# Patient Record
Sex: FEMALE | Race: WHITE | Employment: UNEMPLOYED | URBAN - METROPOLITAN AREA
[De-identification: names, ages, dates, MRNs, and addresses within clinical notes are randomized per-mention and may not be internally consistent; named-entity substitution may affect disease eponyms.]

---

## 2024-06-26 ENCOUNTER — HOSPITAL ENCOUNTER (INPATIENT)
Facility: HOSPITAL | Age: 44
LOS: 6 days | Discharge: HOME | DRG: 885 | End: 2024-07-02
Attending: STUDENT IN AN ORGANIZED HEALTH CARE EDUCATION/TRAINING PROGRAM | Admitting: PSYCHIATRY & NEUROLOGY
Payer: MEDICARE

## 2024-06-26 DIAGNOSIS — F34.1 PERSISTENT DEPRESSIVE DISORDER: ICD-10-CM

## 2024-06-26 DIAGNOSIS — R45.851 SUICIDAL IDEATION: ICD-10-CM

## 2024-06-26 DIAGNOSIS — F25.0 SCHIZOAFFECTIVE DISORDER, BIPOLAR TYPE (HCC): ICD-10-CM

## 2024-06-26 DIAGNOSIS — R45.851 SUICIDAL IDEATIONS: Primary | ICD-10-CM

## 2024-06-26 PROBLEM — F25.9 SCHIZOAFFECTIVE DISORDER (HCC): Status: ACTIVE | Noted: 2024-06-26

## 2024-06-26 LAB
ALBUMIN SERPL-MCNC: 3.3 G/DL (ref 3.5–5)
ALBUMIN/GLOB SERPL: 1 (ref 1.1–2.2)
ALP SERPL-CCNC: 93 U/L (ref 45–117)
ALT SERPL-CCNC: 23 U/L (ref 12–78)
AMPHET UR QL SCN: NEGATIVE
ANION GAP SERPL CALC-SCNC: 7 MMOL/L (ref 5–15)
APPEARANCE UR: ABNORMAL
AST SERPL W P-5'-P-CCNC: 20 U/L (ref 15–37)
BACTERIA URNS QL MICRO: NEGATIVE /HPF
BARBITURATES UR QL SCN: NEGATIVE
BASOPHILS # BLD: 0.1 K/UL (ref 0–0.1)
BASOPHILS NFR BLD: 1 % (ref 0–1)
BENZODIAZ UR QL: NEGATIVE
BILIRUB SERPL-MCNC: 0.4 MG/DL (ref 0.2–1)
BILIRUB UR QL: NEGATIVE
BUN SERPL-MCNC: 11 MG/DL (ref 6–20)
BUN/CREAT SERPL: 14 (ref 12–20)
CA-I BLD-MCNC: 8.5 MG/DL (ref 8.5–10.1)
CANNABINOIDS UR QL SCN: NEGATIVE
CHLORIDE SERPL-SCNC: 109 MMOL/L (ref 97–108)
CO2 SERPL-SCNC: 25 MMOL/L (ref 21–32)
COCAINE UR QL SCN: NEGATIVE
COLOR UR: ABNORMAL
CREAT SERPL-MCNC: 0.81 MG/DL (ref 0.55–1.02)
DIFFERENTIAL METHOD BLD: ABNORMAL
EOSINOPHIL # BLD: 0.4 K/UL (ref 0–0.4)
EOSINOPHIL NFR BLD: 4 % (ref 0–7)
EPITH CASTS URNS QL MICRO: ABNORMAL /LPF
ERYTHROCYTE [DISTWIDTH] IN BLOOD BY AUTOMATED COUNT: 15.7 % (ref 11.5–14.5)
ETHANOL SERPL-MCNC: <10 MG/DL (ref 0–0.08)
FLUAV RNA SPEC QL NAA+PROBE: NOT DETECTED
FLUBV RNA SPEC QL NAA+PROBE: NOT DETECTED
GLOBULIN SER CALC-MCNC: 3.2 G/DL (ref 2–4)
GLUCOSE SERPL-MCNC: 100 MG/DL (ref 65–100)
GLUCOSE UR STRIP.AUTO-MCNC: NEGATIVE MG/DL
HCG SERPL QL: NEGATIVE
HCT VFR BLD AUTO: 31.5 % (ref 35–47)
HGB BLD-MCNC: 10.1 G/DL (ref 11.5–16)
HGB UR QL STRIP: NEGATIVE
IMM GRANULOCYTES # BLD AUTO: 0 K/UL (ref 0–0.04)
IMM GRANULOCYTES NFR BLD AUTO: 0 % (ref 0–0.5)
KETONES UR QL STRIP.AUTO: NEGATIVE MG/DL
LEUKOCYTE ESTERASE UR QL STRIP.AUTO: NEGATIVE
LYMPHOCYTES # BLD: 3.3 K/UL (ref 0.8–3.5)
LYMPHOCYTES NFR BLD: 32 % (ref 12–49)
Lab: NORMAL
MCH RBC QN AUTO: 24.5 PG (ref 26–34)
MCHC RBC AUTO-ENTMCNC: 32.1 G/DL (ref 30–36.5)
MCV RBC AUTO: 76.5 FL (ref 80–99)
METHADONE UR QL: NEGATIVE
MONOCYTES # BLD: 0.9 K/UL (ref 0–1)
MONOCYTES NFR BLD: 9 % (ref 5–13)
MUCOUS THREADS URNS QL MICRO: ABNORMAL /LPF
NEUTS SEG # BLD: 5.5 K/UL (ref 1.8–8)
NEUTS SEG NFR BLD: 54 % (ref 32–75)
NITRITE UR QL STRIP.AUTO: NEGATIVE
NRBC # BLD: 0 K/UL (ref 0–0.01)
NRBC BLD-RTO: 0 PER 100 WBC
OPIATES UR QL: NEGATIVE
PCP UR QL: NEGATIVE
PH UR STRIP: 5 (ref 5–8)
PLATELET # BLD AUTO: 360 K/UL (ref 150–400)
PMV BLD AUTO: 9.5 FL (ref 8.9–12.9)
POTASSIUM SERPL-SCNC: 3.9 MMOL/L (ref 3.5–5.1)
PROT SERPL-MCNC: 6.5 G/DL (ref 6.4–8.2)
PROT UR STRIP-MCNC: 30 MG/DL
RBC # BLD AUTO: 4.12 M/UL (ref 3.8–5.2)
RBC #/AREA URNS HPF: ABNORMAL /HPF (ref 0–5)
SARS-COV-2 RNA RESP QL NAA+PROBE: NOT DETECTED
SODIUM SERPL-SCNC: 141 MMOL/L (ref 136–145)
SP GR UR REFRACTOMETRY: 1.02 (ref 1–1.03)
URINE CULTURE IF INDICATED: ABNORMAL
UROBILINOGEN UR QL STRIP.AUTO: 0.1 EU/DL (ref 0.1–1)
WBC # BLD AUTO: 10.2 K/UL (ref 3.6–11)
WBC URNS QL MICRO: ABNORMAL /HPF (ref 0–4)

## 2024-06-26 PROCEDURE — 99285 EMERGENCY DEPT VISIT HI MDM: CPT

## 2024-06-26 PROCEDURE — 6370000000 HC RX 637 (ALT 250 FOR IP): Performed by: STUDENT IN AN ORGANIZED HEALTH CARE EDUCATION/TRAINING PROGRAM

## 2024-06-26 PROCEDURE — 85025 COMPLETE CBC W/AUTO DIFF WBC: CPT

## 2024-06-26 PROCEDURE — 6370000000 HC RX 637 (ALT 250 FOR IP): Performed by: PSYCHIATRY & NEUROLOGY

## 2024-06-26 PROCEDURE — 90791 PSYCH DIAGNOSTIC EVALUATION: CPT

## 2024-06-26 PROCEDURE — 80307 DRUG TEST PRSMV CHEM ANLYZR: CPT

## 2024-06-26 PROCEDURE — 82077 ASSAY SPEC XCP UR&BREATH IA: CPT

## 2024-06-26 PROCEDURE — 80053 COMPREHEN METABOLIC PANEL: CPT

## 2024-06-26 PROCEDURE — 81001 URINALYSIS AUTO W/SCOPE: CPT

## 2024-06-26 PROCEDURE — 1240000000 HC EMOTIONAL WELLNESS R&B

## 2024-06-26 PROCEDURE — 87636 SARSCOV2 & INF A&B AMP PRB: CPT

## 2024-06-26 PROCEDURE — 84703 CHORIONIC GONADOTROPIN ASSAY: CPT

## 2024-06-26 RX ORDER — TRAZODONE HYDROCHLORIDE 50 MG/1
50 TABLET ORAL NIGHTLY PRN
Status: DISCONTINUED | OUTPATIENT
Start: 2024-06-26 | End: 2024-06-29

## 2024-06-26 RX ORDER — ACETAMINOPHEN 325 MG/1
650 TABLET ORAL EVERY 4 HOURS PRN
Status: DISCONTINUED | OUTPATIENT
Start: 2024-06-26 | End: 2024-07-02 | Stop reason: HOSPADM

## 2024-06-26 RX ORDER — LORAZEPAM 2 MG/ML
2 INJECTION INTRAMUSCULAR EVERY 4 HOURS PRN
Status: DISCONTINUED | OUTPATIENT
Start: 2024-06-26 | End: 2024-06-28

## 2024-06-26 RX ORDER — HYDROXYZINE 50 MG/1
50 TABLET, FILM COATED ORAL 3 TIMES DAILY PRN
Status: DISCONTINUED | OUTPATIENT
Start: 2024-06-26 | End: 2024-07-02 | Stop reason: HOSPADM

## 2024-06-26 RX ORDER — ESCITALOPRAM OXALATE 10 MG/1
20 TABLET ORAL DAILY
Status: DISCONTINUED | OUTPATIENT
Start: 2024-06-27 | End: 2024-07-02 | Stop reason: HOSPADM

## 2024-06-26 RX ORDER — ZIPRASIDONE HYDROCHLORIDE 20 MG/1
20 CAPSULE ORAL EVERY 12 HOURS PRN
Status: DISCONTINUED | OUTPATIENT
Start: 2024-06-26 | End: 2024-07-02 | Stop reason: HOSPADM

## 2024-06-26 RX ORDER — ZIPRASIDONE MESYLATE 20 MG/ML
20 INJECTION, POWDER, LYOPHILIZED, FOR SOLUTION INTRAMUSCULAR EVERY 12 HOURS PRN
Status: DISCONTINUED | OUTPATIENT
Start: 2024-06-26 | End: 2024-07-02 | Stop reason: HOSPADM

## 2024-06-26 RX ORDER — DIVALPROEX SODIUM 500 MG/1
1000 TABLET, DELAYED RELEASE ORAL 2 TIMES DAILY
Status: DISCONTINUED | OUTPATIENT
Start: 2024-06-26 | End: 2024-07-02 | Stop reason: HOSPADM

## 2024-06-26 RX ORDER — LORAZEPAM 1 MG/1
2 TABLET ORAL EVERY 4 HOURS PRN
Status: DISCONTINUED | OUTPATIENT
Start: 2024-06-26 | End: 2024-06-28

## 2024-06-26 RX ORDER — NICOTINE 21 MG/24HR
1 PATCH, TRANSDERMAL 24 HOURS TRANSDERMAL DAILY
Status: DISCONTINUED | OUTPATIENT
Start: 2024-06-26 | End: 2024-07-02 | Stop reason: HOSPADM

## 2024-06-26 RX ADMIN — TRAZODONE HYDROCHLORIDE 50 MG: 50 TABLET ORAL at 21:35

## 2024-06-26 RX ADMIN — LORAZEPAM 2 MG: 1 TABLET ORAL at 21:35

## 2024-06-26 RX ADMIN — LORAZEPAM 2 MG: 1 TABLET ORAL at 12:25

## 2024-06-26 ASSESSMENT — SLEEP AND FATIGUE QUESTIONNAIRES
AVERAGE NUMBER OF SLEEP HOURS: 5
DO YOU USE A SLEEP AID: NO
DO YOU HAVE DIFFICULTY SLEEPING: NO

## 2024-06-26 ASSESSMENT — PAIN - FUNCTIONAL ASSESSMENT: PAIN_FUNCTIONAL_ASSESSMENT: NONE - DENIES PAIN

## 2024-06-26 ASSESSMENT — LIFESTYLE VARIABLES
HOW MANY STANDARD DRINKS CONTAINING ALCOHOL DO YOU HAVE ON A TYPICAL DAY: PATIENT DOES NOT DRINK
HOW OFTEN DO YOU HAVE A DRINK CONTAINING ALCOHOL: NEVER

## 2024-06-26 NOTE — ED NOTES
Pt with 3 wheelchairs full of personal belongings. Charge RN made aware and nursing supervisor stated to place pt things in room behind registration desk for time being. Pt labels on belongings

## 2024-06-26 NOTE — ED NOTES
Assumed care of pt at this time, pt appears to be resting comfortably in green gown on hospital stretcher, bed in lowest position, room door open, 1:1 sitter outside room door, room is psych safe.

## 2024-06-26 NOTE — ED TRIAGE NOTES
Pt reports taking a cab ride from [place] to [facility] and was told to come here pt reports SI without a plan.

## 2024-06-26 NOTE — CONSULTS
hour(s))   Urine Drug Screen    Collection Time: 06/26/24  8:40 AM   Result Value Ref Range    Amphetamine, Urine Negative Negative      Barbiturates, Urine Negative Negative      Benzodiazepines, Urine Negative Negative      Cocaine, Urine Negative Negative      Methadone, Urine Negative Negative      Opiates, Urine Negative Negative      Phencyclidine, Urine Negative Negative      THC, TH-Cannabinol, Urine Negative Negative      Comments:        This test is a screen for drugs of abuse in a medical setting only (i.e., they are unconfirmed results and as such must not be used for non-medical purposes, e.g.,employment testing, legal testing). Due to its inherent nature, false positive (FP) and false negative (FN) results may be obtained. Therefore, if necessary for medical care, recommend confirmation of positive findings by GC/MS.     COVID-19 & Influenza Combo    Collection Time: 06/26/24  8:42 AM    Specimen: Nasopharyngeal   Result Value Ref Range    SARS-CoV-2, PCR Not Detected Not Detected      Rapid Influenza A By PCR Not Detected Not Detected      Rapid Influenza B By PCR Not Detected Not Detected     Urinalysis with Reflex to Culture    Collection Time: 06/26/24  8:42 AM    Specimen: Urine   Result Value Ref Range    Color, UA Yellow/Straw      Appearance Turbid (A) Clear      Specific Gravity, UA 1.023 1.003 - 1.030      pH, Urine 5.0 5.0 - 8.0      Protein, UA 30 (A) Negative mg/dL    Glucose, Ur Negative Negative mg/dL    Ketones, Urine Negative Negative mg/dL    Bilirubin, Urine Negative Negative      Blood, Urine Negative Negative      Urobilinogen, Urine 0.1 0.1 - 1.0 EU/dL    Nitrite, Urine Negative Negative      Leukocyte Esterase, Urine Negative Negative      WBC, UA 0-4 0 - 4 /hpf    RBC, UA 0-5 0 - 5 /hpf    Epithelial Cells, UA Many (A) Few /lpf    BACTERIA, URINE Negative Negative /hpf    Urine Culture if Indicated Culture not indicated by UA result Culture not indicated by UA result
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
## 2024-06-26 NOTE — PLAN OF CARE
Problem: Behavior  Goal: Pt/Family maintain appropriate behavior and adhere to behavioral management agreement, if implemented  Description: INTERVENTIONS:  1. Assess patient/family's coping skills and  non-compliant behavior (including use of illegal substances)  2. Notify security of behavior or suspected illegal substances which indicate the need for search of the family and/or belongings  3. Encourage verbalization of thoughts and concerns in a socially appropriate manner  4. Utilize positive, consistent limit setting strategies supporting safety of patient, staff and others  5. Encourage participation in the decision making process about the behavioral management agreement  6. If a visitor's behavior poses a threat to safety call refer to organization policy.  7. Initiate consult with , Psychosocial CNS, Spiritual Care as appropriate  Outcome: Not Progressing     Problem: Behavior  Goal: Pt/Family maintain appropriate behavior and adhere to behavioral management agreement, if implemented  Description: INTERVENTIONS:  1. Assess patient/family's coping skills and  non-compliant behavior (including use of illegal substances)  2. Notify security of behavior or suspected illegal substances which indicate the need for search of the family and/or belongings  3. Encourage verbalization of thoughts and concerns in a socially appropriate manner  4. Utilize positive, consistent limit setting strategies supporting safety of patient, staff and others  5. Encourage participation in the decision making process about the behavioral management agreement  6. If a visitor's behavior poses a threat to safety call refer to organization policy.  7. Initiate consult with , Psychosocial CNS, Spiritual Care as appropriate  Outcome: Not Progressing

## 2024-06-26 NOTE — GROUP NOTE
Group Therapy Note    Date: 6/26/2024    Group Start Time: 1300  Group End Time: 1330  Group Topic: Process Group - Inpatient    SSR 2 BEHA [facility]    Rose Almodovar        Group Therapy Note: This writer facilitated a group where each individual was provided with a handout on \"personal values\" and each individual discussed their personal values when it came to family, friends, society and themselves.     Attendees: 2       Patient's Goal:  to attend groups    Notes:  Pt had just arrived to the [facility] and was in the process of being oriented to the [facility] and so could not attend.       Signature:  Rose Almodovar
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
## 2024-06-27 PROCEDURE — 6370000000 HC RX 637 (ALT 250 FOR IP): Performed by: STUDENT IN AN ORGANIZED HEALTH CARE EDUCATION/TRAINING PROGRAM

## 2024-06-27 PROCEDURE — 1240000000 HC EMOTIONAL WELLNESS R&B

## 2024-06-27 PROCEDURE — 6370000000 HC RX 637 (ALT 250 FOR IP): Performed by: PSYCHIATRY & NEUROLOGY

## 2024-06-27 RX ORDER — LEVOTHYROXINE SODIUM 0.07 MG/1
75 TABLET ORAL
Status: DISCONTINUED | OUTPATIENT
Start: 2024-06-28 | End: 2024-07-02 | Stop reason: HOSPADM

## 2024-06-27 RX ADMIN — TRAZODONE HYDROCHLORIDE 50 MG: 50 TABLET ORAL at 19:59

## 2024-06-27 RX ADMIN — LORAZEPAM 2 MG: 1 TABLET ORAL at 19:59

## 2024-06-27 RX ADMIN — LORAZEPAM 2 MG: 1 TABLET ORAL at 08:29

## 2024-06-27 RX ADMIN — ESCITALOPRAM OXALATE 20 MG: 10 TABLET ORAL at 08:21

## 2024-06-27 RX ADMIN — LORAZEPAM 2 MG: 1 TABLET ORAL at 15:31

## 2024-06-27 NOTE — PLAN OF CARE
Problem: Self Harm/Suicidality  Goal: Will have no self-injury during hospital stay  Description: INTERVENTIONS:  1.  Ensure constant observer at bedside with Q15M safety checks  2.  Maintain a safe environment  3.  Secure patient belongings  4.  Ensure family/visitors adhere to safety recommendations  5.  Ensure safety tray has been added to patient's diet order  6.  Every shift and PRN: Re-assess suicidal risk via Frequent Screener    Outcome: Progressing     Problem: Behavior  Goal: Pt/Family maintain appropriate behavior and adhere to behavioral management agreement, if implemented  Description: INTERVENTIONS:  1. Assess patient/family's coping skills and  non-compliant behavior (including use of illegal substances)  2. Notify security of behavior or suspected illegal substances which indicate the need for search of the family and/or belongings  3. Encourage verbalization of thoughts and concerns in a socially appropriate manner  4. Utilize positive, consistent limit setting strategies supporting safety of patient, staff and others  5. Encourage participation in the decision making process about the behavioral management agreement  6. If a visitor's behavior poses a threat to safety call refer to organization policy.  7. Initiate consult with , Psychosocial CNS, Spiritual Care as appropriate  6/26/2024 1608 by Autumn Ramos, RN  Outcome: Not Progressing     Problem: Involuntary Admit  Goal: Will cooperate with staff recommendations and doctor's orders and will demonstrate appropriate behavior  Description: INTERVENTIONS:  1. Treat underlying conditions and offer medication as ordered  2. Educate regarding involuntary admission procedures and rules  3. Contain excessive/inappropriate behavior per unit and hospital policies  Outcome: Not Progressing     Problem: Self Care Deficit  Goal: Return ADL status to a safe level of function  Description: INTERVENTIONS:  1. Administer medication as

## 2024-06-27 NOTE — GROUP NOTE
Group Therapy Note    Date: 6/27/2024    Group Start Time: 1605  Group End Time: 1645  Group Topic: Recreational    SSR 2 BH NON ACUTE    Kamille Stephens        Group Therapy Note    Facilitated leisure skills group to reinforce positive coping and to manage mood through music, social interaction, group activities and art task       Attendees: 7/9      Notes:  Encouraged but did not attend    Discipline Responsible: Recreational Therapist      Signature:  ZEYNEP Ceron

## 2024-06-27 NOTE — PLAN OF CARE
Problem: Discharge Planning  Goal: Discharge to home or other facility with appropriate resources  Outcome: Progressing     Problem: Behavior  Goal: Pt/Family maintain appropriate behavior and adhere to behavioral management agreement, if implemented  Description: INTERVENTIONS:  1. Assess patient/family's coping skills and  non-compliant behavior (including use of illegal substances)  2. Notify security of behavior or suspected illegal substances which indicate the need for search of the family and/or belongings  3. Encourage verbalization of thoughts and concerns in a socially appropriate manner  4. Utilize positive, consistent limit setting strategies supporting safety of patient, staff and others  5. Encourage participation in the decision making process about the behavioral management agreement  6. If a visitor's behavior poses a threat to safety call refer to organization policy.  7. Initiate consult with , Psychosocial CNS, Spiritual Care as appropriate  Outcome: Progressing     Problem: Nayeli  Goal: Will exhibit normal sleep and speech and no impulsivity  Description: INTERVENTIONS:  1. Administer medication as ordered  2. Set limits on impulsive behavior  3. Make attempts to decrease external stimuli as possible  Outcome: Progressing     Problem: Psychosis  Goal: Will report no hallucinations or delusions  Description: INTERVENTIONS:  1. Administer medication as  ordered  2. Assist with reality testing to support increasing orientation  3. Assess if patient's hallucinations or delusions are encouraging self harm or harm to others and intervene as appropriate  Outcome: Progressing     Problem: Involuntary Admit  Goal: Will cooperate with staff recommendations and doctor's orders and will demonstrate appropriate behavior  Description: INTERVENTIONS:  1. Treat underlying conditions and offer medication as ordered  2. Educate regarding involuntary admission procedures and rules  3. Contain

## 2024-06-27 NOTE — GROUP NOTE
Group Therapy Note    Date: 6/27/2024    Group Start Time: 1115  Group End Time: 1155  Group Topic: Education Group - Inpatient    SSR 2  NON ACUTE    Kamille Stephens        Group Therapy Note    Facilitated discussion focus on identifying different barriers that has prevented progress and identifying ways to confront them       Attendees: 5/9      Notes:  Encouraged but did not attend    Discipline Responsible: Recreational Therapist      Signature:  ZEYNEP Ceron

## 2024-06-27 NOTE — CARE COORDINATION
06/27/24 1308   [facility]   Date of Plan 06/27/24   Date of Next Review 07/04/24   Primary Diagnosis Code Schizoaffective disorder (HCC) F25.9   Barriers to Treatment Need for psychoeducation   Strengths Incorporated in Plan Acknowledging need for assistance   Plan of Care   Long Term Goal (LTG) Stated in patient/guardian terms \"get housing, maybe a hotel for a week\"   Short Term Goal 1   Short Term Goal 1 Client will be oriented to program and staff, and participate in assessment process   Baseline Functioning to make the individual comfortable with the environment while in the hospital   Target the individual will be able to approach staff and voice appropriate needs   Objectives Client will participate in group therapy;Client will participate in individual therapy   Intervention 1 Assess safety   Frequency daily   Measured by Staff observation;Self report   Staff Responsible Clinical staff;[facility] staff   Intervention 2 Acknowledge client strengths   Frequency daily   Measured by Self report;Staff observation   Staff Responsible Clinical staff;[facility] staff   Intervention 3 Group therapy   Frequency daily   Measured by Staff observation;Self report   Staff Responsible Clinical staff;[facility] staff   STG Goal 1 Status: Patient Appears to be  Progressing toward treatment plan goal   Short Term Goal 2   Short Term Goal 2 Client will learn and demonstrate effective coping skills   Baseline Functioning to improve the over all quality of life   Target the individual will be able to approach staff and voice appropriate needs   Objectives Client will participate in group therapy;Client will participate in individual therapy   Intervention 1 Indvidual therapy   Frequency daily   Measured by Staff observation;Self report   Staff Responsible Clinical staff;[facility] staff   Intervention 2 Milieu therapy and support   Frequency daily   Measured by Staff observation;Self report   Staff Responsible Clinical staff;[facility] staff   Intervention 3 Monitor

## 2024-06-27 NOTE — GROUP NOTE
Group Therapy Note    Date: 6/27/2024    Group Start Time: 1315  Group End Time: 1345  Group Topic: Process Group - Inpatient    SSR 2 BEHA [facility]    Joann Orellana        Group Therapy Note  Process group was to focus on communication and socialization skills. Writer had the pts each pick a number and then read/answer the question. Not many group members attended and they asked for group to be over early.   Attendees: 2-9           Notes:  Pt was not on the unit at the time of group       Signature:  Joann Orellana

## 2024-06-27 NOTE — GROUP NOTE
Group Therapy Note    Date: 6/26/2024    Group Start Time: 1945  Group End Time: 2030  Group Topic: Recreational    SSR 2 BH NON ACUTE    Zoey Orozco        Group Therapy Note    Attendees: 7/9    Recreational Therapist facilitated structured leisure skills group to introduce healthy leisure skills as positive way to cope and manage mood.           Notes:  Did not attend group despite encouragement    Discipline Responsible: Recreational Therapist      Signature:  ZEYNEP Pink

## 2024-06-28 PROCEDURE — 1240000000 HC EMOTIONAL WELLNESS R&B

## 2024-06-28 PROCEDURE — 6370000000 HC RX 637 (ALT 250 FOR IP): Performed by: STUDENT IN AN ORGANIZED HEALTH CARE EDUCATION/TRAINING PROGRAM

## 2024-06-28 PROCEDURE — 6370000000 HC RX 637 (ALT 250 FOR IP): Performed by: PSYCHIATRY & NEUROLOGY

## 2024-06-28 RX ORDER — LORAZEPAM 1 MG/1
1 TABLET ORAL EVERY 6 HOURS PRN
Status: DISCONTINUED | OUTPATIENT
Start: 2024-06-28 | End: 2024-06-29

## 2024-06-28 RX ORDER — LORAZEPAM 2 MG/ML
1 INJECTION INTRAMUSCULAR EVERY 6 HOURS PRN
Status: DISCONTINUED | OUTPATIENT
Start: 2024-06-28 | End: 2024-06-29

## 2024-06-28 RX ADMIN — LORAZEPAM 1 MG: 1 TABLET ORAL at 17:51

## 2024-06-28 RX ADMIN — LORAZEPAM 2 MG: 1 TABLET ORAL at 09:45

## 2024-06-28 RX ADMIN — TRAZODONE HYDROCHLORIDE 50 MG: 50 TABLET ORAL at 21:08

## 2024-06-28 RX ADMIN — LEVOTHYROXINE SODIUM 75 MCG: 0.07 TABLET ORAL at 06:37

## 2024-06-28 RX ADMIN — ESCITALOPRAM OXALATE 20 MG: 10 TABLET ORAL at 08:28

## 2024-06-28 NOTE — PLAN OF CARE
Problem: Discharge Planning  Goal: Discharge to home or other facility with appropriate resources  6/27/2024 0915 by Mary Jo Miller RN  Outcome: Progressing     Problem: Behavior  Goal: Pt/Family maintain appropriate behavior and adhere to behavioral management agreement, if implemented  Description: INTERVENTIONS:  1. Assess patient/family's coping skills and  non-compliant behavior (including use of illegal substances)  2. Notify security of behavior or suspected illegal substances which indicate the need for search of the family and/or belongings  3. Encourage verbalization of thoughts and concerns in a socially appropriate manner  4. Utilize positive, consistent limit setting strategies supporting safety of patient, staff and others  5. Encourage participation in the decision making process about the behavioral management agreement  6. If a visitor's behavior poses a threat to safety call refer to organization policy.  7. Initiate consult with , Psychosocial CNS, Spiritual Care as appropriate  6/27/2024 0915 by Mary Jo Miller RN  Outcome: Progressing     Problem: Depression  Goal: Will be euthymic at discharge  Description: INTERVENTIONS:  1. Administer medication as ordered  2. Provide emotional support via 1:1 interaction with staff  3. Encourage involvement in milieu/groups/activities  4. Monitor for social isolation  6/27/2024 2214 by Gelacio Campos RN  Outcome: Progressing  6/27/2024 0915 by Mary Jo Miller RN  Outcome: Progressing     Problem: Nayeli  Goal: Will exhibit normal sleep and speech and no impulsivity  Description: INTERVENTIONS:  1. Administer medication as ordered  2. Set limits on impulsive behavior  3. Make attempts to decrease external stimuli as possible  6/27/2024 2214 by Gelacio Campos RN  Outcome: Progressing  6/27/2024 0915 by Mary Jo Miller RN  Outcome: Progressing     Problem: Psychosis  Goal: Will report no hallucinations or delusions  Description:

## 2024-06-28 NOTE — GROUP NOTE
Group Therapy Note    Date: 6/28/2024    Group Start Time: 1120  Group End Time: 1200  Group Topic: Education Group - Inpatient    SSR 2  NON ACUTE    Kamille Stephens        Group Therapy Note    Facilitated discussion on stress exploration focused on being able to identify daily hassles, major life changes and life circumstances that contribute to stress and identify daily uplifts, healthy coping strategies and protective factors that counteract stress      Attendees: 7/8       Notes:  Encouraged but did not attend    Discipline Responsible: Recreational Therapist      Signature:  RAQUEL CeronS

## 2024-06-28 NOTE — PROGRESS NOTES
PSYCHIATRIC PROGRESS NOTE         Patient Name  Charlotte Alvarez   Date of Birth 1980   [facility] [id number]   Medical Record Number  [id number]      Age  43 y.o.   PCP No primary care provider on file.   Admit date:  6/26/2024    Room Number  239/02   [facility]   Date of Service  6/27/2024            HISTORY OF PRESENT ILLNESS/INTERVAL HISTORY:              MENTAL STATUS EXAM & VITALS                    VITALS:     No data found.  Wt Readings from Last 3 Encounters:   06/26/24 102.1 kg (225 lb)     Temp Readings from Last 3 Encounters:     BP Readings from Last 3 Encounters:   06/26/24 120/72     Pulse Readings from Last 3 Encounters:   06/26/24 76            DATA     LABORATORY DATA:(reviewed/updated 6/27/2024)  No results found for this or any previous visit (from the past 24 hour(s)).   No results found for: \"VALAC\", \"VALP\", \"CARB2\"  No results found for: \"LITHM\"   RADIOLOGY REPORTS:(reviewed/updated 6/27/2024)  No results found.       MEDICATIONS     ALL MEDICATIONS:   Current Facility-Administered Medications   Medication Dose Route Frequency    [START ON 6/28/2024] levothyroxine (SYNTHROID) tablet 75 mcg  75 mcg Oral QAM AC    nicotine (NICODERM CQ) 14 MG/24HR 1 patch  1 patch TransDERmal Daily    acetaminophen (TYLENOL) tablet 650 mg  650 mg Oral Q4H PRN    hydrOXYzine HCl (ATARAX) tablet 50 mg  50 mg Oral TID PRN    traZODone (DESYREL) tablet 50 mg  50 mg Oral Nightly PRN    magnesium hydroxide (MILK OF MAGNESIA) 400 MG/5ML suspension 30 mL  30 mL Oral Daily PRN    LORazepam (ATIVAN) tablet 2 mg  2 mg Oral Q4H PRN    LORazepam (ATIVAN) injection 2 mg  2 mg IntraMUSCular Q4H PRN    ziprasidone (GEODON) capsule 20 mg  20 mg Oral Q12H PRN    ziprasidone (GEODON) injection 20 mg  20 mg IntraMUSCular Q12H PRN    paliperidone palmitate ER (INVEGA SUSTENNA) IM injection 234 mg  234 mg IntraMUSCular Q28 Days    escitalopram (LEXAPRO) tablet 20 mg  20 mg Oral Daily    divalproex (DEPAKOTE) DR tablet 1,000

## 2024-06-28 NOTE — GROUP NOTE
Group Therapy Note    Date: 6/28/2024    Group Start Time: 1345  Group End Time: 1415  Group Topic: Process Group - Inpatient    SSR 2 BEHA [facility] ACUTE    Joann Orellana        Group Therapy Note  Due to the unit needs writer went to each room individually speaking with the pts about the group topic. Many of the pts interacted well with the writer, sharing their strengths and qualities. Writer observed that many of the pts had a difficult time coming up with their positive qualities. Writer provided each pt with a worksheet and went over it with them  Attendees: 4-7         Notes:  Writer tried to speak with pt and pt was sleeping. Writer left the worksheet for the pt       Signature:  Joann Orellana

## 2024-06-28 NOTE — GROUP NOTE
Group Therapy Note    Date: 6/28/2024    Group Start Time: 1555  Group End Time: 1630  Group Topic: Recreational    SSR 2 BH NON ACUTE    Kamille Stephens        Group Therapy Note    Facilitated leisure skills group to reinforce positive coping and to manage mood through music, social interaction, group activities and art task       Attendees: 3/6      Notes:  Encouraged but did not attend    Discipline Responsible: Recreational Therapist      Signature:  ZEYNEP Ceron

## 2024-06-29 PROCEDURE — 1240000000 HC EMOTIONAL WELLNESS R&B

## 2024-06-29 PROCEDURE — 6370000000 HC RX 637 (ALT 250 FOR IP): Performed by: STUDENT IN AN ORGANIZED HEALTH CARE EDUCATION/TRAINING PROGRAM

## 2024-06-29 PROCEDURE — 6370000000 HC RX 637 (ALT 250 FOR IP): Performed by: PSYCHIATRY & NEUROLOGY

## 2024-06-29 RX ORDER — TRAZODONE HYDROCHLORIDE 50 MG/1
50 TABLET ORAL NIGHTLY
Status: DISCONTINUED | OUTPATIENT
Start: 2024-06-29 | End: 2024-07-02 | Stop reason: HOSPADM

## 2024-06-29 RX ORDER — LORAZEPAM 2 MG/ML
0.5 INJECTION INTRAMUSCULAR EVERY 6 HOURS PRN
Status: DISCONTINUED | OUTPATIENT
Start: 2024-06-29 | End: 2024-07-02 | Stop reason: HOSPADM

## 2024-06-29 RX ORDER — LORAZEPAM 0.5 MG/1
0.5 TABLET ORAL EVERY 6 HOURS PRN
Status: DISCONTINUED | OUTPATIENT
Start: 2024-06-29 | End: 2024-07-02 | Stop reason: HOSPADM

## 2024-06-29 RX ADMIN — LEVOTHYROXINE SODIUM 75 MCG: 0.07 TABLET ORAL at 06:32

## 2024-06-29 RX ADMIN — LORAZEPAM 0.5 MG: 0.5 TABLET ORAL at 20:02

## 2024-06-29 RX ADMIN — LORAZEPAM 1 MG: 1 TABLET ORAL at 12:58

## 2024-06-29 RX ADMIN — DIVALPROEX SODIUM 1000 MG: 500 TABLET, DELAYED RELEASE ORAL at 20:06

## 2024-06-29 RX ADMIN — ESCITALOPRAM OXALATE 20 MG: 10 TABLET ORAL at 08:39

## 2024-06-29 RX ADMIN — TRAZODONE HYDROCHLORIDE 50 MG: 50 TABLET ORAL at 20:06

## 2024-06-29 RX ADMIN — LORAZEPAM 1 MG: 1 TABLET ORAL at 06:33

## 2024-06-29 NOTE — GROUP NOTE
Group Therapy Note    Date: 6/29/2024    Group Start Time: 1045  Group End Time: 1145  Group Topic: Education Group - Inpatient    SSR 2 BH NON ACUTE    Zoey Orozco        Group Therapy Note    Attendees: 4/7    Facilitated structured group discussion to identify stressors, symptoms of stress and positive coping strategies.          Notes:  Did not attend group     Discipline Responsible: Recreational Therapist      Signature:  ZEYNEP Pink
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
## 2024-06-29 NOTE — PLAN OF CARE
Problem: Behavior  Goal: Pt/Family maintain appropriate behavior and adhere to behavioral management agreement, if implemented  Description: INTERVENTIONS:  1. Assess patient/family's coping skills and  non-compliant behavior (including use of illegal substances)  2. Notify security of behavior or suspected illegal substances which indicate the need for search of the family and/or belongings  3. Encourage verbalization of thoughts and concerns in a socially appropriate manner  4. Utilize positive, consistent limit setting strategies supporting safety of patient, staff and others  5. Encourage participation in the decision making process about the behavioral management agreement  6. If a visitor's behavior poses a threat to safety call refer to organization policy.  7. Initiate consult with , Psychosocial CNS, Spiritual Care as appropriate  Outcome: Not Progressing

## 2024-06-29 NOTE — PROGRESS NOTES
PSYCHIATRIC PROGRESS NOTE         Patient Name  Charlotte Alvarez   Date of Birth 1980   [facility] [id number]   Medical Record Number  [id number]      Age  43 y.o.   PCP No primary care provider on file.   Admit date:  6/26/2024    Room Number  239/02   [facility]   Date of Service  6/29/2024            HISTORY OF PRESENT ILLNESS/INTERVAL HISTORY:              MENTAL STATUS EXAM & VITALS                    VITALS:     No data found.  Wt Readings from Last 3 Encounters:   06/26/24 102.1 kg (225 lb)     Temp Readings from Last 3 Encounters:     BP Readings from Last 3 Encounters:   No data found for BP     Pulse Readings from Last 3 Encounters:   No data found for Pulse            DATA     LABORATORY DATA:(reviewed/updated 6/29/2024)  No results found for this or any previous visit (from the past 24 hour(s)).   No results found for: \"VALAC\", \"VALP\", \"CARB2\"  No results found for: \"LITHM\"   RADIOLOGY REPORTS:(reviewed/updated 6/29/2024)  No results found.       MEDICATIONS     ALL MEDICATIONS:   Current Facility-Administered Medications   Medication Dose Route Frequency    LORazepam (ATIVAN) tablet 1 mg  1 mg Oral Q6H PRN    LORazepam (ATIVAN) injection 1 mg  1 mg IntraMUSCular Q6H PRN    levothyroxine (SYNTHROID) tablet 75 mcg  75 mcg Oral QAM AC    nicotine (NICODERM CQ) 14 MG/24HR 1 patch  1 patch TransDERmal Daily    acetaminophen (TYLENOL) tablet 650 mg  650 mg Oral Q4H PRN    hydrOXYzine HCl (ATARAX) tablet 50 mg  50 mg Oral TID PRN    traZODone (DESYREL) tablet 50 mg  50 mg Oral Nightly PRN    magnesium hydroxide (MILK OF MAGNESIA) 400 MG/5ML suspension 30 mL  30 mL Oral Daily PRN    ziprasidone (GEODON) capsule 20 mg  20 mg Oral Q12H PRN    ziprasidone (GEODON) injection 20 mg  20 mg IntraMUSCular Q12H PRN    paliperidone palmitate ER (INVEGA SUSTENNA) IM injection 234 mg  234 mg IntraMUSCular Q28 Days    escitalopram (LEXAPRO) tablet 20 mg  20 mg Oral Daily    divalproex (DEPAKOTE) DR tablet 1,000 mg

## 2024-06-29 NOTE — GROUP NOTE
Group Therapy Note    Date: 6/29/2024    Group Start Time: 1530  Group End Time: 1620  Group Topic: Recreational    SSR 2 BH NON ACUTE    Zoey Orozco        Group Therapy Note    Attendees: 3/9    Recreational Therapist facilitated structured leisure skills group to introduce healthy leisure skills as positive way to cope and manage mood.           Notes:  Did not attend group despite encouragement    Discipline Responsible: Recreational Therapist      Signature:  ZEYNEP Pink

## 2024-06-29 NOTE — PLAN OF CARE
Problem: Behavior  Goal: Pt/Family maintain appropriate behavior and adhere to behavioral management agreement, if implemented  Description: INTERVENTIONS:  1. Assess patient/family's coping skills and  non-compliant behavior (including use of illegal substances)  2. Notify security of behavior or suspected illegal substances which indicate the need for search of the family and/or belongings  3. Encourage verbalization of thoughts and concerns in a socially appropriate manner  4. Utilize positive, consistent limit setting strategies supporting safety of patient, staff and others  5. Encourage participation in the decision making process about the behavioral management agreement  6. If a visitor's behavior poses a threat to safety call refer to organization policy.  7. Initiate consult with , Psychosocial CNS, Spiritual Care as appropriate  6/28/2024 2143 by Janina Shaw, LPN  Outcome: Not Progressing

## 2024-06-30 PROCEDURE — 6370000000 HC RX 637 (ALT 250 FOR IP): Performed by: STUDENT IN AN ORGANIZED HEALTH CARE EDUCATION/TRAINING PROGRAM

## 2024-06-30 PROCEDURE — 6370000000 HC RX 637 (ALT 250 FOR IP): Performed by: PSYCHIATRY & NEUROLOGY

## 2024-06-30 PROCEDURE — 1240000000 HC EMOTIONAL WELLNESS R&B

## 2024-06-30 RX ADMIN — LEVOTHYROXINE SODIUM 75 MCG: 0.07 TABLET ORAL at 07:40

## 2024-06-30 RX ADMIN — ESCITALOPRAM OXALATE 20 MG: 10 TABLET ORAL at 08:33

## 2024-06-30 RX ADMIN — LORAZEPAM 0.5 MG: 0.5 TABLET ORAL at 16:45

## 2024-06-30 RX ADMIN — LORAZEPAM 0.5 MG: 0.5 TABLET ORAL at 09:23

## 2024-06-30 RX ADMIN — TRAZODONE HYDROCHLORIDE 50 MG: 50 TABLET ORAL at 20:22

## 2024-06-30 NOTE — PLAN OF CARE
Problem: Psychosis  Goal: Will report no hallucinations or delusions  Description: INTERVENTIONS:  1. Administer medication as  ordered  2. Assist with reality testing to support increasing orientation  3. Assess if patient's hallucinations or delusions are encouraging self harm or harm to others and intervene as appropriate  Outcome: /[facility] Not Progressing     Problem: Anxiety  Goal: Will report anxiety at manageable levels  Description: INTERVENTIONS:  1. Administer medication as ordered  2. Teach and rehearse alternative coping skills  3. Provide emotional support with 1:1 interaction with staff  6/29/2024 2010 by Zenobia Navarro, RN  Outcome: [facility] Not Progressing  6/29/2024 0944 by Aguilar Jordan, RN  Outcome: Progressing

## 2024-06-30 NOTE — BSMART NOTE
BSMART assessment completed, and suicide risk level noted to be moderate . Primary Nurse Dionna  and Charge Nurse N/A  and Physician  notified. Concerns not observed.        
Patient refused to have her vital signs taken. The tone of her voice is loud and aggressive. She was sitting on the bed appearing to be talking to someone and listening, but she was the only one in the room.  
will need assistance with placement /shelter referrals  .  The patient does not have legal issues pending. The patient's source of income comes from disability.  Muslim and cultural practices have not been voiced at this time.    The patient's greatest support comes from did not identify anyone  and this person will not be involved with the treatment.    The patient has not been in an event described as horrible or outside the realm of ordinary life experience either currently or in the past.  The patient has not been a victim of sexual/physical abuse.    Section VII - Other Areas of Clinical Concern  The highest grade achieved is some college  with the overall quality of school experience being described as ok.  The patient is currently disabled and speaks English as a primary language.  The patient has no communication impairments affecting communication. The patient's preference for learning can be described as: can read and write adequately.  The patient's hearing is normal.  The patient's vision is normal.      Marilin Oquendo LCSW

## 2024-06-30 NOTE — GROUP NOTE
Group Therapy Note    Date: 6/30/2024    Group Start Time: 1045  Group End Time: 1145  Group Topic: Recreational    SSR 2 BH NON ACUTE    Zoey Orozco        Group Therapy Note    Attendees: 5/9     Recreational Therapist facilitated a structured group discussion to define boundaries and identify healthy and unhealthy boundaries. Pt also encouraged to identify limits needed for those in their [employer] of support.             Notes:  Did not attend group despite encouragement      Discipline Responsible: Recreational Therapist      Signature:  ZEYNEP Pink

## 2024-06-30 NOTE — PLAN OF CARE
Problem: Behavior  Goal: Pt/Family maintain appropriate behavior and adhere to behavioral management agreement, if implemented  Description: INTERVENTIONS:  1. Assess patient/family's coping skills and  non-compliant behavior (including use of illegal substances)  2. Notify security of behavior or suspected illegal substances which indicate the need for search of the family and/or belongings  3. Encourage verbalization of thoughts and concerns in a socially appropriate manner  4. Utilize positive, consistent limit setting strategies supporting safety of patient, staff and others  5. Encourage participation in the decision making process about the behavioral management agreement  6. If a visitor's behavior poses a threat to safety call refer to organization policy.  7. Initiate consult with , Psychosocial CNS, Spiritual Care as appropriate  Outcome: Progressing     Problem: Self Harm/Suicidality  Goal: Will have no self-injury during hospital stay  Description: INTERVENTIONS:  1.  Ensure constant observer at bedside with Q15M safety checks  2.  Maintain a safe environment  3.  Secure patient belongings  4.  Ensure family/visitors adhere to safety recommendations  5.  Ensure safety tray has been added to patient's diet order  6.  Every shift and PRN: Re-assess suicidal risk via Frequent Screener    Outcome: Progressing     Problem: Depression  Goal: Will be euthymic at discharge  Description: INTERVENTIONS:  1. Administer medication as ordered  2. Provide emotional support via 1:1 interaction with staff  3. Encourage involvement in milieu/groups/activities  4. Monitor for social isolation  Outcome: Progressing     Problem: Discharge Planning  Goal: Discharge to home or other facility with appropriate resources  6/30/2024 1100 by Aguilar Jordan RN  Outcome: Not Progressing     Problem: Psychosis  Goal: Will report no hallucinations or delusions  Description: INTERVENTIONS:  1. Administer

## 2024-06-30 NOTE — PROGRESS NOTES
PSYCHIATRIC PROGRESS NOTE         Patient Name  Charlotte Alvarez   Date of Birth 1980   [facility] [id number]   Medical Record Number  [id number]      Age  43 y.o.   PCP No primary care provider on file.   Admit date:  6/26/2024    Room Number  239/02   [facility]   Date of Service  6/29/2024            HISTORY OF PRESENT ILLNESS/INTERVAL HISTORY:              MENTAL STATUS EXAM & VITALS                    VITALS:     No data found.  Wt Readings from Last 3 Encounters:   06/26/24 102.1 kg (225 lb)     Temp Readings from Last 3 Encounters:   No data found for Temp     BP Readings from Last 3 Encounters:   No data found for BP     Pulse Readings from Last 3 Encounters:   No data found for Pulse            DATA     LABORATORY DATA:(reviewed/updated 6/29/2024)  No results found for this or any previous visit (from the past 24 hour(s)).   No results found for: \"VALAC\", \"VALP\", \"CARB2\"  No results found for: \"LITHM\"   RADIOLOGY REPORTS:(reviewed/updated 6/29/2024)  No results found.       MEDICATIONS     ALL MEDICATIONS:   Current Facility-Administered Medications   Medication Dose Route Frequency    traZODone (DESYREL) tablet 50 mg  50 mg Oral Nightly    LORazepam (ATIVAN) injection 0.5 mg  0.5 mg IntraMUSCular Q6H PRN    LORazepam (ATIVAN) tablet 0.5 mg  0.5 mg Oral Q6H PRN    levothyroxine (SYNTHROID) tablet 75 mcg  75 mcg Oral QAM AC    nicotine (NICODERM CQ) 14 MG/24HR 1 patch  1 patch TransDERmal Daily    acetaminophen (TYLENOL) tablet 650 mg  650 mg Oral Q4H PRN    hydrOXYzine HCl (ATARAX) tablet 50 mg  50 mg Oral TID PRN    magnesium hydroxide (MILK OF MAGNESIA) 400 MG/5ML suspension 30 mL  30 mL Oral Daily PRN    ziprasidone (GEODON) capsule 20 mg  20 mg Oral Q12H PRN    ziprasidone (GEODON) injection 20 mg  20 mg IntraMUSCular Q12H PRN    paliperidone palmitate ER (INVEGA SUSTENNA) IM injection 234 mg  234 mg IntraMUSCular Q28 Days    escitalopram (LEXAPRO) tablet 20 mg  20 mg Oral Daily    divalproex

## 2024-06-30 NOTE — PLAN OF CARE
Problem: Discharge Planning  Goal: Discharge to home or other facility with appropriate resources  Outcome: Not Progressing     Problem: Psychosis  Goal: Will report no hallucinations or delusions  Description: INTERVENTIONS:  1. Administer medication as  ordered  2. Assist with reality testing to support increasing orientation  3. Assess if patient's hallucinations or delusions are encouraging self harm or harm to others and intervene as appropriate  Outcome: Not Progressing     Problem: Discharge Planning  Goal: Discharge to home or other facility with appropriate resources  Outcome: Not Progressing     Problem: Psychosis  Goal: Will report no hallucinations or delusions  Description: INTERVENTIONS:  1. Administer medication as  ordered  2. Assist with reality testing to support increasing orientation  3. Assess if patient's hallucinations or delusions are encouraging self harm or harm to others and intervene as appropriate  Outcome: Not Progressing

## 2024-06-30 NOTE — GROUP NOTE
Group Therapy Note    Date: 6/30/2024    Group Start Time: 1500  Group End Time: 1545  Group Topic: Recreational    SSR 2 BH NON ACUTE    Zoey Orozco        Group Therapy Note    Attendees: 6/10     Recreational Therapist facilitated structured leisure skills group to introduce healthy leisure skills as positive way to cope and manage mood.            Notes:  Did not attend group despite encouragement    Discipline Responsible: Recreational Therapist      Signature:  ZEYNEP Pink

## 2024-07-01 PROCEDURE — 6370000000 HC RX 637 (ALT 250 FOR IP): Performed by: PSYCHIATRY & NEUROLOGY

## 2024-07-01 PROCEDURE — 6370000000 HC RX 637 (ALT 250 FOR IP): Performed by: STUDENT IN AN ORGANIZED HEALTH CARE EDUCATION/TRAINING PROGRAM

## 2024-07-01 PROCEDURE — 1240000000 HC EMOTIONAL WELLNESS R&B

## 2024-07-01 RX ADMIN — LORAZEPAM 0.5 MG: 0.5 TABLET ORAL at 08:23

## 2024-07-01 RX ADMIN — LORAZEPAM 0.5 MG: 0.5 TABLET ORAL at 16:43

## 2024-07-01 RX ADMIN — TRAZODONE HYDROCHLORIDE 50 MG: 50 TABLET ORAL at 21:30

## 2024-07-01 RX ADMIN — LEVOTHYROXINE SODIUM 75 MCG: 0.07 TABLET ORAL at 06:24

## 2024-07-01 RX ADMIN — ESCITALOPRAM OXALATE 20 MG: 10 TABLET ORAL at 08:23

## 2024-07-01 NOTE — PROGRESS NOTES
PSYCHIATRIC PROGRESS NOTE         Patient Name  Charlotte Alvarez   Date of Birth 1980   [facility] [id number]   Medical Record Number  [id number]      Age  43 y.o.   PCP No primary care provider on file.   Admit date:  6/26/2024    Room Number  239/02   [facility]   Date of Service  7/1/2024            HISTORY OF PRESENT ILLNESS/INTERVAL HISTORY:              MENTAL STATUS EXAM & VITALS                    VITALS:     Patient Vitals for the past 24 hrs:   Temp Pulse Resp BP   06/30/24 1914 98.6 °F (37 °C) 88 18 130/88   06/30/24 1645 98.6 °F (37 °C) 96 18 (!) 132/96     Wt Readings from Last 3 Encounters:   06/26/24 102.1 kg (225 lb)     Temp Readings from Last 3 Encounters:   06/30/24 98.6 °F (37 °C) (Oral)     BP Readings from Last 3 Encounters:   06/30/24 130/88     Pulse Readings from Last 3 Encounters:   06/30/24 88            DATA     LABORATORY DATA:(reviewed/updated 7/1/2024)  No results found for this or any previous visit (from the past 24 hour(s)).   No results found for: \"VALAC\", \"VALP\", \"CARB2\"  No results found for: \"LITHM\"   RADIOLOGY REPORTS:(reviewed/updated 7/1/2024)  No results found.       MEDICATIONS     ALL MEDICATIONS:   Current Facility-Administered Medications   Medication Dose Route Frequency    traZODone (DESYREL) tablet 50 mg  50 mg Oral Nightly    LORazepam (ATIVAN) injection 0.5 mg  0.5 mg IntraMUSCular Q6H PRN    LORazepam (ATIVAN) tablet 0.5 mg  0.5 mg Oral Q6H PRN    levothyroxine (SYNTHROID) tablet 75 mcg  75 mcg Oral QAM AC    nicotine (NICODERM CQ) 14 MG/24HR 1 patch  1 patch TransDERmal Daily    acetaminophen (TYLENOL) tablet 650 mg  650 mg Oral Q4H PRN    hydrOXYzine HCl (ATARAX) tablet 50 mg  50 mg Oral TID PRN    magnesium hydroxide (MILK OF MAGNESIA) 400 MG/5ML suspension 30 mL  30 mL Oral Daily PRN    ziprasidone (GEODON) capsule 20 mg  20 mg Oral Q12H PRN    ziprasidone (GEODON) injection 20 mg  20 mg IntraMUSCular Q12H PRN    paliperidone palmitate ER (INVEGA

## 2024-07-01 NOTE — GROUP NOTE
Group Therapy Note    Date: 7/1/2024    Group Start Time: 0910  Group End Time: 0948  Group Topic: Community Meeting    SSR 2 [facility] ACUTE    Nicole Santillan        Group Therapy Note    Attendees: 5       Patient's Goal:      Notes:  Patient did not attend group.  Status After Intervention:      Participation Level:     Participation Quality:       Speech:        Thought Process/Content:       Affective Functioning:       Mood:       Level of consciousness:        Response to Learning:       Endings:     Modes of Intervention:       Discipline Responsible: Behavorial Health Tech      Signature:  Nicole Santillan

## 2024-07-01 NOTE — GROUP NOTE
Group Therapy Note    Date: 7/1/2024    Group Start Time: 1015  Group End Time: 1039  Group Topic: Nursing    SSR 2 [facility] ACUTE    Patricia Bloom RN        Group Therapy Note    Attendees: 3       Patient's Goal:  pt.invited to group but declined    Notes:      Status After Intervention:      Participation Level:     Participation Quality:       Speech:        Thought Process/Content:       Affective Functioning:       Mood:       Level of consciousness:        Response to Learning:       Endings:     Modes of Intervention:       Discipline Responsible:       Signature:  Patricia Bloom RN

## 2024-07-01 NOTE — GROUP NOTE
Group Therapy Note    Date: 7/1/2024    Group Start Time: 1330  Group End Time: 1400  Group Topic: Process Group - Inpatient    SSR 2 [facility] ACUTE    Rose Almodovar        Group Therapy Note: This writer discussed \"stress exploration\" to include it's triggers and positive coping skills.     Attendees: 5       Patient's Goal:  to attend groups.     Notes:  Pt was encouraged to attend but did not.       Signature:  Rose Almodovar

## 2024-07-01 NOTE — GROUP NOTE
Group Therapy Note    Date: 7/1/2024    Group Start Time: 1740  Group End Time: 1840  Group Topic: Recreational    SSR 2 BH NON ACUTE    Kamille Stephens        Group Therapy Note    Facilitated leisure skills group to reinforce positive coping and to manage mood through music, social interaction, group activities and art task       Attendees: 5/10       Notes:  Encouraged but did not attend    Discipline Responsible: Recreational Therapist      Signature:  ZEYNEP Ceron

## 2024-07-01 NOTE — GROUP NOTE
Group Therapy Note    Date: 7/1/2024    Group Start Time: 1125  Group End Time: 1200  Group Topic: Education Group - Inpatient    SSR 2  NON ACUTE    Kamille Stephens        Group Therapy Note    Facilitated group to focus on “exploring values “and “utilized values discussion questions       Attendees: 6/10       Notes:  Encouraged but did not attend    Discipline Responsible: Recreational Therapist      Signature:  ZEYNEP Ceron

## 2024-07-02 VITALS
WEIGHT: 225 LBS | OXYGEN SATURATION: 97 % | HEIGHT: 62 IN | DIASTOLIC BLOOD PRESSURE: 88 MMHG | HEART RATE: 88 BPM | TEMPERATURE: 98.6 F | RESPIRATION RATE: 18 BRPM | SYSTOLIC BLOOD PRESSURE: 130 MMHG | BODY MASS INDEX: 41.41 KG/M2

## 2024-07-02 PROCEDURE — 6370000000 HC RX 637 (ALT 250 FOR IP): Performed by: PSYCHIATRY & NEUROLOGY

## 2024-07-02 RX ORDER — TRAZODONE HYDROCHLORIDE 50 MG/1
50 TABLET ORAL NIGHTLY
Qty: 30 TABLET | Refills: 0 | Status: SHIPPED | OUTPATIENT
Start: 2024-07-02

## 2024-07-02 RX ORDER — NICOTINE 21 MG/24HR
1 PATCH, TRANSDERMAL 24 HOURS TRANSDERMAL DAILY
Qty: 30 PATCH | Refills: 3 | Status: SHIPPED | OUTPATIENT
Start: 2024-07-02

## 2024-07-02 RX ORDER — ESCITALOPRAM OXALATE 20 MG/1
20 TABLET ORAL DAILY
Qty: 30 TABLET | Refills: 0 | Status: SHIPPED | OUTPATIENT
Start: 2024-07-03

## 2024-07-02 RX ORDER — LEVOTHYROXINE SODIUM 0.07 MG/1
75 TABLET ORAL
Qty: 30 TABLET | Refills: 0 | Status: SHIPPED | OUTPATIENT
Start: 2024-07-03

## 2024-07-02 RX ORDER — LORAZEPAM 0.5 MG/1
0.5 TABLET ORAL 2 TIMES DAILY PRN
Qty: 30 TABLET | Refills: 1 | Status: SHIPPED | OUTPATIENT
Start: 2024-07-02 | End: 2024-08-01

## 2024-07-02 RX ADMIN — LEVOTHYROXINE SODIUM 75 MCG: 0.07 TABLET ORAL at 06:11

## 2024-07-02 RX ADMIN — ESCITALOPRAM OXALATE 20 MG: 10 TABLET ORAL at 09:38

## 2024-07-02 NOTE — PLAN OF CARE
Problem: Discharge Planning  Goal: Discharge to home or other facility with appropriate resources  Outcome: Progressing     Problem: Behavior  Goal: Pt/Family maintain appropriate behavior and adhere to behavioral management agreement, if implemented  Description: INTERVENTIONS:  1. Assess patient/family's coping skills and  non-compliant behavior (including use of illegal substances)  2. Notify security of behavior or suspected illegal substances which indicate the need for search of the family and/or belongings  3. Encourage verbalization of thoughts and concerns in a socially appropriate manner  4. Utilize positive, consistent limit setting strategies supporting safety of patient, staff and others  5. Encourage participation in the decision making process about the behavioral management agreement  6. If a visitor's behavior poses a threat to safety call refer to organization policy.  7. Initiate consult with , Psychosocial CNS, Spiritual Care as appropriate  7/2/2024 1108 by Elvira Levi, RN  Outcome: Progressing  7/2/2024 0047 by Kenneth Stack RN  Outcome: Not Progressing     Problem: Self Harm/Suicidality  Goal: Will have no self-injury during hospital stay  Description: INTERVENTIONS:  1.  Ensure constant observer at bedside with Q15M safety checks  2.  Maintain a safe environment  3.  Secure patient belongings  4.  Ensure family/visitors adhere to safety recommendations  5.  Ensure safety tray has been added to patient's diet order  6.  Every shift and PRN: Re-assess suicidal risk via Frequent Screener    Outcome: Progressing     Problem: Depression  Goal: Will be euthymic at discharge  Description: INTERVENTIONS:  1. Administer medication as ordered  2. Provide emotional support via 1:1 interaction with staff  3. Encourage involvement in milieu/groups/activities  4. Monitor for social isolation  Outcome: Progressing     Problem: Psychosis  Goal: Will report no hallucinations or

## 2024-07-02 NOTE — PROGRESS NOTES
PSYCHIATRIC PROGRESS NOTE         Patient Name  Charlotte Alvraez   Date of Birth 1980   [facility] [id number]   Medical Record Number  [id number]      Age  43 y.o.   PCP No primary care provider on file.   Admit date:  6/26/2024    Room Number  239/02   [facility]   Date of Service  7/1/2024            HISTORY OF PRESENT ILLNESS/INTERVAL HISTORY:              MENTAL STATUS EXAM & VITALS                    VITALS:     No data found.  Wt Readings from Last 3 Encounters:   06/26/24 102.1 kg (225 lb)     Temp Readings from Last 3 Encounters:   06/30/24 98.6 °F (37 °C) (Oral)     BP Readings from Last 3 Encounters:   06/30/24 130/88     Pulse Readings from Last 3 Encounters:   06/30/24 88            DATA     LABORATORY DATA:(reviewed/updated 7/1/2024)  No results found for this or any previous visit (from the past 24 hour(s)).   No results found for: \"VALAC\", \"VALP\", \"CARB2\"  No results found for: \"LITHM\"   RADIOLOGY REPORTS:(reviewed/updated 7/1/2024)  No results found.       MEDICATIONS     ALL MEDICATIONS:   Current Facility-Administered Medications   Medication Dose Route Frequency    traZODone (DESYREL) tablet 50 mg  50 mg Oral Nightly    LORazepam (ATIVAN) injection 0.5 mg  0.5 mg IntraMUSCular Q6H PRN    LORazepam (ATIVAN) tablet 0.5 mg  0.5 mg Oral Q6H PRN    levothyroxine (SYNTHROID) tablet 75 mcg  75 mcg Oral QAM AC    nicotine (NICODERM CQ) 14 MG/24HR 1 patch  1 patch TransDERmal Daily    acetaminophen (TYLENOL) tablet 650 mg  650 mg Oral Q4H PRN    hydrOXYzine HCl (ATARAX) tablet 50 mg  50 mg Oral TID PRN    magnesium hydroxide (MILK OF MAGNESIA) 400 MG/5ML suspension 30 mL  30 mL Oral Daily PRN    ziprasidone (GEODON) capsule 20 mg  20 mg Oral Q12H PRN    ziprasidone (GEODON) injection 20 mg  20 mg IntraMUSCular Q12H PRN    paliperidone palmitate ER (INVEGA SUSTENNA) IM injection 234 mg  234 mg IntraMUSCular Q28 Days    escitalopram (LEXAPRO) tablet 20 mg  20 mg Oral Daily    divalproex (DEPAKOTE)

## 2024-07-02 NOTE — BH NOTE
Alert, oriented to person, place and situation. Affect and mood are constricted. Denies SI/HI/AVH at this time. Thought processes are disorganized and fixated on medications. Pt is needy and demanding, impatient with requests. Isolative to room. Pt asked writer to ensure she has a week of hotel stay and transportation to her next destination arranged. Pt continues to come out of her room with gown open in front, regardless of multiple redirection.   
Behavioral Health Treatment Team Note     Patient goal(s) for today: \"work on the letter for my \"  Treatment team focus/goals: medication management, dc planning, group therapy    Progress note: Pt was seen in her room as she was resting. Pt woke up easily and presented to be alert, guarded, minimally engaging in conversation but superficial, calm, fair judgment/insight. Pt denied current si/hi/ah/vh. Pt reported her dep and aniety to be a 8 on a scale of 1-10 but did not elaborate further. Pt voiced that she would like her lexapro increased and this writer informed her that attending provider will be made aware. Pt talked about wanting to work on writing a letter to her  about not wanting her payee involved anymore. Pt did not voice any further concerns but presented to be somewhat dismissive of this writer. Pt cont to meet criteria for inpt stay for further stabilization through meds management.     LOS:  2  Expected LOS: 5-7    Insurance info/prescription coverage:   MEDICARE PART A AND B.  [facility]   Date of last family contact:  pt declined any family contact  Family requesting physician contact today:  No  Discharge plan:  to stabilize pt  Guns in the home:  No   Outpatient provider(s):  will be established prior to dc    Participating treatment team members: Charlotte Alvarez, * (assigned SW), Rose Almodovar, MS  
Behavioral Health Treatment Team Note     Patient goal(s) for today: pt did not voice any  Treatment team focus/goals: meds management, dc planning, group therapy    Progress note: Pt was seen in her room as she was laying down with her eyes open looking at the ceiling. Pt looked at this writer and looked away and minimally interacted the whole time with this writer while looking at the ceiling. Pt presented as alert, oriented, calm, somewhat dismissive, demanding, disinterested. Pt denied current si/hi/ah/vh. Pt fixated on the fact that this writer needs to write a letter to her  requested that her payee no longer be her payee. This writer informed pt that this was outside of of the capabilities that this writer could provide and that the pt can work on this issue with her  one on one, to which the pt seemed a bit annoyed and irritable. Pt did report some depression and anxiety due to not knowing where she would be going after discharge. This writer informed pt that assistance would be provided in regards to this issue and the pt presented to be disinterested. Pt complained that she could not sleep at all last night, this writer informed pt that the attending provider will be made aware of this. Pt cont to meet criteria for inpt stay for further stabilization through meds management.     LOS:  5  Expected LOS: 5-7    Insurance info/prescription coverage:  MEDICARE PART A AND B.  [facility]   Date of last family contact:   pt declined any family contact   Family requesting physician contact today:  No  Discharge plan:  to stabilize pt   Guns in the home:  No   Outpatient provider(s):  will be established prior to dc     Participating treatment team members: Charlotte Alvarez, * (assigned SW), Rose Almodovar MS  
Charlotte is 44yo female admitted from [facility] ED to [facility] under the psychiatric care of Dr. Alanis.     DX:  Suicidal Ideations  HX:  Schizoaffective, bipolar type  Status:  Voluntary  UDS:  Negative      Charlotte arrived on unit at 1135, escorted by 3 ED staff and security, to transport three wheelchairs full of patient belongings in bags and suitcases. Patient was escorted via 4th wheelchair, wearing a green gown. Upon arrival to front unit, patient jumped out of wheelchair, stated \"Show me to my room\" and began making loud bird-like noises as she walked down the goncalves. Admitting RN led patient to acute unit Room 239-02. Safety search completed by Admitting RN & T Keenan. Lighter in bra. No redness or bruising to skin, no open sores or wounds. Continued loud, abrupt bird-like noises/chirps. Unable to sign admission paperwork, unable to complete OQ Analyst.  Patient received lunch tray, verified no food allergies.   Per chart review, patient was hospitalized at [facility] in April 2024 & [facility] in 2024. Stabilized on depakote & Invega Sustenna 234mg. Last Invega Sustenna administered 5/1/24. Valproic Acid level on 5/8/24 was 20 mcg/mL.    Per chart review of discharge summary from [facility], patient has been to hotels 11 times between October 2023 and February 2024. Medication noncompliance and behavioral disturbance necessitate the police to be called. In the past, she was placed on the housing list for [facility] but eventually refused when available, stating \" I do not want to live in an apartment\".   Patient has a guardian, and a . She is from [place], [place]. She took a taxi, from [facility], to [facility], last night, 6.25.24. When she arrived to [facility], they told her she could not readily be admitted by walking in, she had to go to [facility] for medical clearance. Patient took another taxi to our ED, triaged at 0531, reporting suicidal ideations with no plan.     Admission and Medication orders 
DISCHARGE SUMMARY    NAME:Charlotte Alvarez  : 1980  MRN: [id number]    The patient Charlotte Alvarez exhibits the ability to control behavior in a less restrictive environment.  Patient's level of functioning is improving.  No assaultive/destructive behavior has been observed for the past 24 hours.  No suicidal/homicidal threat or behavior has been observed for the past 24 hours.  There is no evidence of serious medication side effects.  Patient has not been in physical or protective restraints for at least the past 24 hours.    If weapons involved, how are they secured? N/a    Is patient aware of and in agreement with discharge plan? yes    Arrangements for medication:  Prescriptions see chart    Copy of discharge instructions to provider?:  yes    Arrangements for transportation home:  cab    Keep all follow up appointments as scheduled, continue to take prescribed medications per physician instructions.  Mental health crisis number:  [phone]  Mental Health Emergency WARM LINE      [phone] ([phone])      M-F: 9am to 9pm      Sat & Sun: 5pm - 9pm  National suicide prevention lines:                             7-216-LJSFHZJ ([phone])       [phone]-TALK ([phone])    Crisis Text Line:  Text HOME to 825173  
Dayshift/Discharge    Pt up ad yanci on unit mostly isolative to room, appearance is disheveled and unkempt. Pt presents as hostile and labile. Pt refused all medication except for scheduled lexapro. Pt denied depression, anxiety, SI/HI/AVH. Pt denied any pain or physical complaints. Pt ordered to discharge today by Dr. Alanis. Pt given hard copy prescriptions and instructed to take to pharmacy of choice. Writer reviewed AVS discharge summary and included follow up appt information. Writer reviewed safety plan with pt and pt verbalized understanding of plan. Pt received all belongings and accounted for all items. Pt continued to deny SI/HI at time of discharge. Pt continued to deny pain or any physical complaints at time of discharge. Pt escorted off unit at 1050.by this writer, Ole DIETRICH CC, Chiuqi PCT, and Rose CM due to pt having multiple bags of personal property. Pt picked up by [employer] cab at main entrance of the hospital.   
Patient  has been isolative in her room,coming out of her room demanding with staff. Patient did get up for snacks.Patient refused her Depakote but requested Trazodone   Which was effective.Patient has poor insight patient remains on q15 minutes safety checks.  
Patient is demanding and preoccupied.She is labile and anxious. She was not med compliant and refused her Invega Sustenna injection before bed as well as her Depakote, saying \"I don't take Depakote, I just need mirtazapine and klonopin.\" Nurse offered her ativan and trazodone instead, as that what was ordered, and patient accepted this. She went to bed after medication was passed and has appeared to sleep well so far tonight, with no signs of distress noted, respirations regular and unlabored.Will continue to monitor patient closely every 15 mins as per unit protocol .   
Patient remains on close observation.  Patient has been mostly isolative to her room.  She did come out of her room for snack.  She told staff to \"Go and kill yourself\" and \"to die.\"  She yells at staff.  She declined to her scheduled Depakote.  She would only take the Trazodone.  She was sarcastic towards staff.  Continue to assess.  
Patient resting in bed quietly with eyes closed. No acute distress noted.Denied everything, appeared to be responding to internal stimuli.  Remains on every 15 minutes close observation for safety.  
Per Dr Alanis, pt does not need a 1:1 while on BHU.  
Pt assessed in the hallway. Pt denied Depression, Anxiety, SI/HI. Pt reportedly has been antagonizing peers on the unit. Pt and peer were talked to about personal boundaries and respect this shift. Pt stated that \"Your mental patients need medication because they're talking to people that aren't there\" Pt declined her Depakote this shift, but has taken her trazodone and Ativan. Staff will continue to monitor.  
Pt declined to participate in OQ  
Pt isolated in her room most of shift, resting quietly as she sat on her bed. Pt abrupt with verbal responses during 1:1. Pt denied hallucinations and delusions. Pt out to get meds and returned back to her room. Pt declined to attend groups as scheduled. Pt requested prn ativan for anxiety, when asked the cause of her feeling anxious; pt unable to give reason. Pt educated on needing to document reason for giving med she them stated she is anxious because she does  not know where she will be going after discharge. Pt received Ativan 1mg po at 1258 rated \"10\". Pt asked if she is less anxious within the hours, she would not respond. Will continue to monitor closely.  
Pt isolated in her room most of the shift sitting on her bed staring at the wall. Pt declined to talk with writer to assess concerns. Pt declined VPA as ordered this morning, attending psychiatrist made aware. Pt requested and received Ativan 0.5mg at 923am for anxiety, denied relief within the hour. Will continue to monitor pt and support as needed.   
Pt.is up at intervals,affect is flat,appetite fair, appearance is disheveled, have to constantly remind pt.not to enter hallways with her gown open from the front exposing her breast, denies feeling suicidal,irritable, easily agitated, demanding,refuses her Depakote, isolates,poor insight and judgement,denies hallucinations,no other concerns voiced,remains on close observation.  
Pt.is up for meals and demanding Ativan,mood is labile,constricted, when entering her room she yells out for you to get out,appearance is disheveled,denies feeling suicidal or depressed, low energy level, pt.not attending groups,poor insight and judgement.no interaction observed with peers, observed occasionally talking to self, refuses Depakote.denies anxiety, no other concerns voiced, remains on close observation,  
above two individuals contacted at this time and would like to contact her  at some point to \"get rid of her lg and payee because they are scamming me\" this writer unable to verify accuracy of the above statement at this time. Follow up to take place when pt is organized in her thoughts.    Guardian Contact: follow up pending at this time    Health issues: Diarrhea     Trauma history: The patient has not been a victim of sexual/physical abuse.     Legal issues: The patient does not have legal issues pending.     History of  service: pt did not report any    Financial status: disability     Restorationism/cultural factors: pt did not voice any    Education/work history: some college/unemployed    Have you been licensed as a health care professional (current or ): pt did not voice any    Describe coping skills:bunny Almodovar  2024    
mouth daily  Start taking on: July 3, 2024     levothyroxine 75 MCG tablet  Commonly known as: SYNTHROID  Take 1 tablet by mouth every morning (before breakfast)  Start taking on: July 3, 2024     LORazepam 0.5 MG tablet  Commonly known as: ATIVAN  Take 1 tablet by mouth 2 times daily as needed for Anxiety (moderate to severe anxiety) for up to 30 days. Max Daily Amount: 1 mg     nicotine 14 MG/24HR  Commonly known as: NICODERM CQ  Place 1 patch onto the skin daily     traZODone 50 MG tablet  Commonly known as: DESYREL  Take 1 tablet by mouth nightly               Where to Get Your Medications        Information about where to get these medications is not yet available    Ask your nurse or doctor about these medications  escitalopram 20 MG tablet  levothyroxine 75 MCG tablet  LORazepam 0.5 MG tablet  nicotine 14 MG/24HR  traZODone 50 MG tablet           To obtain results of studies pending at discharge, please contact     Follow-up Information       Follow up With Specialties Details Why Contact Info    [facility]: [facility]  Go on 7/3/2024 Follow up with the above provider for services after discharge.  Rapid Access  Rapid Access is our same-day admissions process available to all [facility] residents seeking mental health and/or substance use treatment services through the [facility]. Rapid Access is how you start services at [facility] and are assigned to your ongoing service provider. Depending on your needs, you may also see a prescriber as part of this process.    [facility] provides ongoing mental health and substance use treatment services for Medicaid and under-insured Individuals.  Rapid Access Hours  Monday - Friday, 8 am - 2 pm Address: [address] [place] [id number]  Phone: [phone]            Advanced Directive:   Does the patient have an appointed surrogate decision maker? Not applicable  Does the patient have a Medical Advance Directive? Not applicable  Does the

## 2025-01-24 ENCOUNTER — HOSPITAL ENCOUNTER (EMERGENCY)
Facility: HOSPITAL | Age: 45
Discharge: ANOTHER HOSPITAL | End: 2025-01-27
Attending: EMERGENCY MEDICINE
Payer: COMMERCIAL

## 2025-01-24 DIAGNOSIS — R45.851 SUICIDE IDEATION: Primary | ICD-10-CM

## 2025-01-24 LAB
ALBUMIN SERPL-MCNC: 3.7 G/DL (ref 3.5–5)
ALBUMIN/GLOB SERPL: 1 (ref 1.1–2.2)
ALP SERPL-CCNC: 108 U/L (ref 45–117)
ALT SERPL-CCNC: 27 U/L (ref 12–78)
AMPHET UR QL SCN: NEGATIVE
ANION GAP SERPL CALC-SCNC: 7 MMOL/L (ref 2–12)
APPEARANCE UR: ABNORMAL
AST SERPL W P-5'-P-CCNC: 30 U/L (ref 15–37)
BACTERIA URNS QL MICRO: NEGATIVE /HPF
BARBITURATES UR QL SCN: NEGATIVE
BASOPHILS # BLD: 0.06 K/UL (ref 0–0.1)
BASOPHILS NFR BLD: 0.6 % (ref 0–1)
BENZODIAZ UR QL: NEGATIVE
BILIRUB SERPL-MCNC: 0.3 MG/DL (ref 0.2–1)
BILIRUB UR QL: NEGATIVE
BUN SERPL-MCNC: 10 MG/DL (ref 6–20)
BUN/CREAT SERPL: 13 (ref 12–20)
CA-I BLD-MCNC: 9.2 MG/DL (ref 8.5–10.1)
CANNABINOIDS UR QL SCN: NEGATIVE
CHLORIDE SERPL-SCNC: 107 MMOL/L (ref 97–108)
CO2 SERPL-SCNC: 23 MMOL/L (ref 21–32)
COCAINE UR QL SCN: NEGATIVE
COLOR UR: ABNORMAL
CREAT SERPL-MCNC: 0.78 MG/DL (ref 0.55–1.02)
DIFFERENTIAL METHOD BLD: ABNORMAL
EOSINOPHIL # BLD: 0.11 K/UL (ref 0–0.4)
EOSINOPHIL NFR BLD: 1.2 % (ref 0–7)
EPITH CASTS URNS QL MICRO: ABNORMAL /LPF
ERYTHROCYTE [DISTWIDTH] IN BLOOD BY AUTOMATED COUNT: 14.9 % (ref 11.5–14.5)
ETHANOL SERPL-MCNC: 186 MG/DL (ref 0–0.08)
GLOBULIN SER CALC-MCNC: 3.7 G/DL (ref 2–4)
GLUCOSE SERPL-MCNC: 111 MG/DL (ref 65–100)
GLUCOSE UR STRIP.AUTO-MCNC: NEGATIVE MG/DL
HCG UR QL: NEGATIVE
HCT VFR BLD AUTO: 36.3 % (ref 35–47)
HGB BLD-MCNC: 11.8 G/DL (ref 11.5–16)
HGB UR QL STRIP: NEGATIVE
IMM GRANULOCYTES # BLD AUTO: 0.04 K/UL (ref 0–0.04)
IMM GRANULOCYTES NFR BLD AUTO: 0.4 % (ref 0–0.5)
KETONES UR QL STRIP.AUTO: NEGATIVE MG/DL
LEUKOCYTE ESTERASE UR QL STRIP.AUTO: ABNORMAL
LYMPHOCYTES # BLD: 2.93 K/UL (ref 0.8–3.5)
LYMPHOCYTES NFR BLD: 31.4 % (ref 12–49)
Lab: NORMAL
MCH RBC QN AUTO: 26.8 PG (ref 26–34)
MCHC RBC AUTO-ENTMCNC: 32.5 G/DL (ref 30–36.5)
MCV RBC AUTO: 82.3 FL (ref 80–99)
METHADONE UR QL: NEGATIVE
MONOCYTES # BLD: 0.48 K/UL (ref 0–1)
MONOCYTES NFR BLD: 5.1 % (ref 5–13)
NEUTS SEG # BLD: 5.71 K/UL (ref 1.8–8)
NEUTS SEG NFR BLD: 61.3 % (ref 32–75)
NITRITE UR QL STRIP.AUTO: NEGATIVE
NRBC # BLD: 0 K/UL (ref 0–0.01)
NRBC BLD-RTO: 0 PER 100 WBC
OPIATES UR QL: NEGATIVE
PCP UR QL: NEGATIVE
PH UR STRIP: 5 (ref 5–8)
PLATELET # BLD AUTO: 423 K/UL (ref 150–400)
PMV BLD AUTO: 10.2 FL (ref 8.9–12.9)
POTASSIUM SERPL-SCNC: 3.7 MMOL/L (ref 3.5–5.1)
PROT SERPL-MCNC: 7.4 G/DL (ref 6.4–8.2)
PROT UR STRIP-MCNC: NEGATIVE MG/DL
RBC # BLD AUTO: 4.41 M/UL (ref 3.8–5.2)
RBC #/AREA URNS HPF: ABNORMAL /HPF (ref 0–5)
SODIUM SERPL-SCNC: 137 MMOL/L (ref 136–145)
SP GR UR REFRACTOMETRY: 1.01 (ref 1–1.03)
UROBILINOGEN UR QL STRIP.AUTO: 0.1 EU/DL (ref 0.1–1)
WBC # BLD AUTO: 9.3 K/UL (ref 3.6–11)
WBC URNS QL MICRO: ABNORMAL /HPF (ref 0–4)

## 2025-01-24 PROCEDURE — 81001 URINALYSIS AUTO W/SCOPE: CPT

## 2025-01-24 PROCEDURE — 80307 DRUG TEST PRSMV CHEM ANLYZR: CPT

## 2025-01-24 PROCEDURE — 99285 EMERGENCY DEPT VISIT HI MDM: CPT

## 2025-01-24 PROCEDURE — 96372 THER/PROPH/DIAG INJ SC/IM: CPT

## 2025-01-24 PROCEDURE — 6360000002 HC RX W HCPCS: Performed by: EMERGENCY MEDICINE

## 2025-01-24 PROCEDURE — 81025 URINE PREGNANCY TEST: CPT

## 2025-01-24 PROCEDURE — 2500000003 HC RX 250 WO HCPCS: Performed by: EMERGENCY MEDICINE

## 2025-01-24 PROCEDURE — 82077 ASSAY SPEC XCP UR&BREATH IA: CPT

## 2025-01-24 PROCEDURE — 80053 COMPREHEN METABOLIC PANEL: CPT

## 2025-01-24 PROCEDURE — 85025 COMPLETE CBC W/AUTO DIFF WBC: CPT

## 2025-01-24 RX ADMIN — WATER 20 MG: 1 INJECTION INTRAMUSCULAR; INTRAVENOUS; SUBCUTANEOUS at 16:53

## 2025-01-24 ASSESSMENT — PAIN DESCRIPTION - LOCATION: LOCATION: GENERALIZED

## 2025-01-24 ASSESSMENT — LIFESTYLE VARIABLES
HOW OFTEN DO YOU HAVE A DRINK CONTAINING ALCOHOL: MONTHLY OR LESS
HOW MANY STANDARD DRINKS CONTAINING ALCOHOL DO YOU HAVE ON A TYPICAL DAY: 3 OR 4

## 2025-01-24 ASSESSMENT — PAIN SCALES - GENERAL: PAINLEVEL_OUTOF10: 7

## 2025-01-24 ASSESSMENT — PAIN - FUNCTIONAL ASSESSMENT: PAIN_FUNCTIONAL_ASSESSMENT: 0-10

## 2025-01-24 NOTE — ED NOTES
Room psych safe, patient in green gown, belongings and patient wanded, officers and Joan, EDT 1:1 at bedside.

## 2025-01-24 NOTE — ED TRIAGE NOTES
Patient arrives with [place] police under paperless ECO states that a taxi brought her here originally trying to convince her to come inside however patient would not comply. Per police ETOH onboard and patient states \" I had 4 wine coolers\"    Mask on patient because she is attempting to spit on officers, myself, and the floor.    Endorses SI no plan, Denies HI.    Alert and oriented and ambulatory on arrival to ER.

## 2025-01-24 NOTE — BSMART NOTE
Deepti, COCO 19, in to prescreen pt via Zoom.  2 police officers are at the door.  LUE forensic restraint intact.  Awaiting medical clearance and D 19 disposition.

## 2025-01-24 NOTE — ED PROVIDER NOTES
01/24/25  4:01 PM   Result Value Ref Range    Pregnancy, Urine Negative Negative     Comprehensive Metabolic Panel    Collection Time: 01/24/25  4:01 PM   Result Value Ref Range    Sodium 137 136 - 145 mmol/L    Potassium 3.7 3.5 - 5.1 mmol/L    Chloride 107 97 - 108 mmol/L    CO2 23 21 - 32 mmol/L    Anion Gap 7 2 - 12 mmol/L    Glucose 111 (H) 65 - 100 mg/dL    BUN 10 6 - 20 mg/dL    Creatinine 0.78 0.55 - 1.02 mg/dL    BUN/Creatinine Ratio 13 12 - 20      Est, Glom Filt Rate >90 >60 ml/min/1.73m2    Calcium 9.2 8.5 - 10.1 mg/dL    Total Bilirubin 0.3 0.2 - 1.0 mg/dL    AST 30 15 - 37 U/L    ALT 27 12 - 78 U/L    Alk Phosphatase 108 45 - 117 U/L    Total Protein 7.4 6.4 - 8.2 g/dL    Albumin 3.7 3.5 - 5.0 g/dL    Globulin 3.7 2.0 - 4.0 g/dL    Albumin/Globulin Ratio 1.0 (L) 1.1 - 2.2     Ethanol    Collection Time: 01/24/25  4:01 PM   Result Value Ref Range    Ethanol Lvl 186 (H) <10 mg/dL   Urinalysis, Micro    Collection Time: 01/24/25  4:01 PM   Result Value Ref Range    WBC, UA 0-4 0 - 4 /hpf    RBC, UA 0-5 0 - 5 /hpf    Epithelial Cells, UA Moderate (A) Few /lpf    BACTERIA, URINE Negative Negative /hpf       EKG:.See ED Course Below    Radiologic Studies:  Non-plain film images such as CT, Ultrasound and MRI are read by the radiologist. Plain radiographic images are visualized and preliminarily interpreted by the ED Provider with the following findings: See ED Course Below    Interpretation per the Radiologist below, if available at the time of this note:  No orders to display        ED COURSE and DIFFERENTIAL DIAGNOSIS/MDM   5:04 PM Differential and Considerations: Patient presents with acute agitation under an emergency custody order with reported recent SI.  Plan to evaluate to medically clear.  District  evaluation as she is under an E CO.    Records Reviewed (source and summary of external notes): Prior medical records and Nursing notes.    Vitals:    Vitals:    01/24/25 1542 01/24/25 1611   BP: (!)

## 2025-01-25 LAB
FLUAV RNA SPEC QL NAA+PROBE: NOT DETECTED
FLUBV RNA SPEC QL NAA+PROBE: NOT DETECTED
SARS-COV-2 RNA RESP QL NAA+PROBE: NOT DETECTED

## 2025-01-25 PROCEDURE — 87636 SARSCOV2 & INF A&B AMP PRB: CPT

## 2025-01-25 PROCEDURE — 6370000000 HC RX 637 (ALT 250 FOR IP): Performed by: EMERGENCY MEDICINE

## 2025-01-25 PROCEDURE — 6370000000 HC RX 637 (ALT 250 FOR IP): Performed by: PSYCHIATRY & NEUROLOGY

## 2025-01-25 RX ORDER — LORAZEPAM 1 MG/1
1 TABLET ORAL EVERY 4 HOURS PRN
Status: DISCONTINUED | OUTPATIENT
Start: 2025-01-25 | End: 2025-01-27 | Stop reason: HOSPADM

## 2025-01-25 RX ORDER — NICOTINE 21 MG/24HR
1 PATCH, TRANSDERMAL 24 HOURS TRANSDERMAL
Status: COMPLETED | OUTPATIENT
Start: 2025-01-25 | End: 2025-01-26

## 2025-01-25 RX ORDER — LORAZEPAM 0.5 MG/1
0.5 TABLET ORAL 2 TIMES DAILY
COMMUNITY

## 2025-01-25 RX ORDER — ESCITALOPRAM OXALATE 10 MG/1
20 TABLET ORAL DAILY
Status: DISCONTINUED | OUTPATIENT
Start: 2025-01-25 | End: 2025-01-27 | Stop reason: HOSPADM

## 2025-01-25 RX ORDER — ACETAMINOPHEN 500 MG
1000 TABLET ORAL
Status: COMPLETED | OUTPATIENT
Start: 2025-01-25 | End: 2025-01-25

## 2025-01-25 RX ORDER — LORAZEPAM 0.5 MG/1
0.5 TABLET ORAL 2 TIMES DAILY
Status: DISCONTINUED | OUTPATIENT
Start: 2025-01-25 | End: 2025-01-27 | Stop reason: HOSPADM

## 2025-01-25 RX ORDER — RISPERIDONE 1 MG/1
1 TABLET ORAL 2 TIMES DAILY
Status: DISCONTINUED | OUTPATIENT
Start: 2025-01-25 | End: 2025-01-27 | Stop reason: HOSPADM

## 2025-01-25 RX ORDER — LEVOTHYROXINE SODIUM 88 UG/1
88 TABLET ORAL DAILY
Status: DISCONTINUED | OUTPATIENT
Start: 2025-01-25 | End: 2025-01-27 | Stop reason: HOSPADM

## 2025-01-25 RX ORDER — LEVOTHYROXINE SODIUM 88 UG/1
88 TABLET ORAL DAILY
COMMUNITY

## 2025-01-25 RX ADMIN — LORAZEPAM 1 MG: 1 TABLET ORAL at 16:39

## 2025-01-25 RX ADMIN — ACETAMINOPHEN 1000 MG: 500 TABLET ORAL at 16:39

## 2025-01-25 RX ADMIN — LEVOTHYROXINE SODIUM 88 MCG: 0.09 TABLET ORAL at 06:45

## 2025-01-25 RX ADMIN — LORAZEPAM 0.5 MG: 0.5 TABLET ORAL at 20:21

## 2025-01-25 RX ADMIN — RISPERIDONE 1 MG: 1 TABLET, FILM COATED ORAL at 20:22

## 2025-01-25 RX ADMIN — LORAZEPAM 0.5 MG: 0.5 TABLET ORAL at 09:10

## 2025-01-25 RX ADMIN — LORAZEPAM 1 MG: 1 TABLET ORAL at 20:20

## 2025-01-25 RX ADMIN — ESCITALOPRAM OXALATE 20 MG: 10 TABLET ORAL at 09:10

## 2025-01-25 NOTE — BSMART NOTE
Medical clinicals sent to [place] with D19 as requested.       Prescreen placed on patients chart.

## 2025-01-25 NOTE — BH NOTE
Consult Note             Reason for consut- TDO , hallucinations, Si      Consult Date: 1/25/2025    Inpatient consult to Psychiatry  Consult performed by: Carri Shaikh MD  Consult ordered by: Rashawn Hernandez MD      History of presenting illness-Ms. she is a 44-year-old  female who presents with suicidal thoughts auditory hallucinations, visual hallucinations, disheveled, disorganized behavior disruptive trying to spit on the officers at the time of initial presentation.Patient noted to be elated, manic, paranoid, actively hallucinating.  Patient had needed medication to help her calm down in the ED.  Patient has been also unable to care for herself, although noted poor primary support system, chronic mental health challenges and previous psychiatric hospitalizations.      mental status examination-patient is alert oriented to name being in the hospital presents disheveled actively responding to internal stimuli states having auditory hallucinations remains trashed with things that is thrown on the floor for Insight poor coping.    A/p-  Schizoaffective d/o    Started on Risperdal 1 mg po bid  Continue lexapro  Under TDO  Patient wants to go only to [facility]       Current Facility-Administered Medications:     nicotine (NICODERM CQ) 21 MG/24HR 1 patch, 1 patch, TransDERmal, NOW, Felix Vaughn MD, 1 patch at 01/25/25 0327    escitalopram (LEXAPRO) tablet 20 mg, 20 mg, Oral, Daily, Felix Vaughn MD, 20 mg at 01/25/25 0910    levothyroxine (SYNTHROID) tablet 88 mcg, 88 mcg, Oral, Daily, Felix Vaughn MD, 88 mcg at 01/25/25 0645    LORazepam (ATIVAN) tablet 0.5 mg, 0.5 mg, Oral, BID, Felix Vaughn MD, 0.5 mg at 01/25/25 0910    risperiDONE (RISPERDAL) tablet 1 mg, 1 mg, Oral, BID, Carri Shaikh MD    ziprasidone (GEODON) 20 mg in sterile water 1 mL injection, 20 mg, IntraMUSCular, PRN, Kelsi Levin MD, 20 mg at 01/24/25 1656    Current Outpatient Medications:

## 2025-01-25 NOTE — BSMART NOTE
This writer rounded on patient in ED 20 with 1:1 sitter and PPO outside the room .  Patient agreed to talk with this writer and was informed of counselor's role.    The patient's appearance is unkempt.  The patient's behavior is guarded and shows poor eye contact. The patient is oriented to time, place, person and situation and only aware of  time, place, and person.  The patient's speech shows no evidence of impairment.  The patient's mood  is euthymic.  The range of affect shows no evidence of impairment and is flat.  The patient's thought content  demonstrates no evidence of impairment.  The thought process shows no evidence of impairment.  The patient's perception shows no evidence of impairment. The patient's memory shows no evidence of impairment.  The patient's appetite shows no evidence of impairment.  The patient's sleep shows no evidence of impairment. The patient shows little insight.  The patient's judgement is psychologically impaired.  Patient denied SI/HI and AVH. The plan is for D19 to continue bed search. Pt has been declined by all [facility] facilities.

## 2025-01-25 NOTE — ED NOTES
Pt advising she is taking 1mg of ativan twice a day and 40mg of lexapro once a day but dispense report does not match along with the temazepam she says she is taking. Dr palafox consulted with about this and denied changing dosages a this time and is basing dosing off what is showing in dispense report.

## 2025-01-26 LAB — VALPROATE SERPL-MCNC: <3 UG/ML (ref 50–100)

## 2025-01-26 PROCEDURE — 93005 ELECTROCARDIOGRAM TRACING: CPT | Performed by: PSYCHIATRY & NEUROLOGY

## 2025-01-26 PROCEDURE — 6370000000 HC RX 637 (ALT 250 FOR IP): Performed by: EMERGENCY MEDICINE

## 2025-01-26 PROCEDURE — 36415 COLL VENOUS BLD VENIPUNCTURE: CPT

## 2025-01-26 PROCEDURE — 6370000000 HC RX 637 (ALT 250 FOR IP): Performed by: PSYCHIATRY & NEUROLOGY

## 2025-01-26 PROCEDURE — 80164 ASSAY DIPROPYLACETIC ACD TOT: CPT

## 2025-01-26 RX ORDER — NICOTINE 21 MG/24HR
1 PATCH, TRANSDERMAL 24 HOURS TRANSDERMAL
Status: DISCONTINUED | OUTPATIENT
Start: 2025-01-26 | End: 2025-01-27 | Stop reason: HOSPADM

## 2025-01-26 RX ADMIN — ESCITALOPRAM OXALATE 20 MG: 10 TABLET ORAL at 08:57

## 2025-01-26 RX ADMIN — LORAZEPAM 0.5 MG: 0.5 TABLET ORAL at 20:27

## 2025-01-26 RX ADMIN — LORAZEPAM 0.5 MG: 0.5 TABLET ORAL at 08:57

## 2025-01-26 RX ADMIN — RISPERIDONE 1 MG: 1 TABLET, FILM COATED ORAL at 08:56

## 2025-01-26 RX ADMIN — LEVOTHYROXINE SODIUM 88 MCG: 0.09 TABLET ORAL at 08:57

## 2025-01-26 ASSESSMENT — PAIN - FUNCTIONAL ASSESSMENT
PAIN_FUNCTIONAL_ASSESSMENT: NONE - DENIES PAIN
PAIN_FUNCTIONAL_ASSESSMENT: NONE - DENIES PAIN

## 2025-01-26 NOTE — BSMART NOTE
This writer rounded on patient.  Patient agreed to talk with this writer and was informed of counselor's role.  There is a sitter and PPO at bedside.  Pt dressed in green gown and appears stated age.  Pt sitting up in bed and raises voice often during assessment.  The patient's appearance is unkempt and shows poor hygiene.  The patient's behavior is agitated and shows poor impulse control. The patient is oriented to time, place, person and situation.  The patient's speech is loud.  The patient's mood  is angry and is irritable.  The range of affect is labile.  The patient's thought content  demonstrates no evidence of impairment.  The thought process is circumstantial.  The patient's perception shows no evidence of impairment. The patient's memory is impaired.  The patient's appetite shows no evidence of impairment.  The patient's sleep shows no evidence of impairment. The patient's insight is blaming.  The patient's judgement is psychologically impaired.  Patient endorses suicidal ideation (without plan/intent), however denies homicidal ideation.   The plan is continued D19 bed search with hearing scheduled for 1/27/25.  Pt states \"I only want to go to [facility] and you need to call them and tell them that.'

## 2025-01-26 NOTE — ED NOTES
Pt ambulated to restroom without assistance, pt has L wrist cuffed with forensic restraint, PD x2 at bedside. Pt presenting with AVH.

## 2025-01-26 NOTE — BSMART NOTE
Writer received call from [place] with D19 stating that the [facility] is reviewing patient and requesting the following: updated vitals, EKG, and depakote level. Writer informed patient's nurse, Tanja.

## 2025-01-26 NOTE — BSMART NOTE
This writer rounded on patient [facility] ER [facility] [place] [facility] noted outside of patient's room. Patient dressed in green hospital gown with right wrist The patient's appearance is unkempt.  The patient's behavior shows no evidence of impairment. The patient is oriented to time, place, person and situation.  The patient's speech shows no evidence of impairment.  The patient's mood  is somewhat irritable.  The range of affect shows no evidence of impairment.  The patient's thought content  demonstrates no evidence of impairment.  The thought process perseveration.  The patient's perception shows no evidence of impairment. The patient's memory is impaired.  The patient's appetite shows no evidence of impairment.  The patient's sleep shows no evidence of impairment. The patient shows little insight.  The patient's judgement is psychologically impaired.  Patient continues to report \"suicidal thoughts and depression\" with no plan. She denies HI and AVH.  The plan is [facility] bed search as patient is currently under TDO with TDO hearing scheduled for 1/27/25.    During interaction, patient hyper fixated on going to [facility]. Patient states that she is originally from [place], [place], but has been in [place] for \"a while.\" Most recently, she states that she has been staying in a motel.  She states that she specifically wants to go to [facility] because \"they have the best resources.\" She states that she has been there in the past, but has not been in \"years.\" Writer discussed patient with TDO hearing scheduled for tomorrow- patient receptive and further asked \"so should I tell them I'll be voluntary so I can go to [facility] or be a TDO?\" Informed patient that she is already under a TDO and during hearing, disposition will be made as far as her commitment. Patient then requested her breakfast tray on the counter- writer provided tray to patient. Patient with no further questions/concerns at this time.

## 2025-01-26 NOTE — BH NOTE
Consult Note             Reason for consut- TDO , hallucinations, Si      Consult Date: 1/26/2025    Inpatient consult to Psychiatry  Consult performed by: Carri Shaikh MD  Consult ordered by: Dann Farias MD      Patient seen this morning.  Patient in bed resting.  Patient expresses still hearing voices feeling depressed having suicidal thoughts.  She continues to insist on wanting to go to [facility].    Mental status examination-patient is alert oriented to name place being in the hospital.  Patient presents disheveled thrown things around in her room.  Patient labile easily irritable hearing voices report suicidal ideations no active plan voiced.  Limited engagement poor eye contact.    A/p-  Schizoaffective d/o    No med changes today.  Started on Risperdal 1 mg po bid  Continue lexapro  Under TDO  Patient wants to go only to [facility]       Current Facility-Administered Medications:     escitalopram (LEXAPRO) tablet 20 mg, 20 mg, Oral, Daily, Felix Vaughn MD, 20 mg at 01/26/25 0857    levothyroxine (SYNTHROID) tablet 88 mcg, 88 mcg, Oral, Daily, Felix Vaughn MD, 88 mcg at 01/26/25 0857    LORazepam (ATIVAN) tablet 0.5 mg, 0.5 mg, Oral, BID, Felxi Vaughn MD, 0.5 mg at 01/26/25 0857    risperiDONE (RISPERDAL) tablet 1 mg, 1 mg, Oral, BID, Carri Shaikh MD, 1 mg at 01/26/25 0856    LORazepam (ATIVAN) tablet 1 mg, 1 mg, Oral, Q4H PRN, Karla Sharp MD, 1 mg at 01/25/25 2020    nicotine (NICODERM CQ) 21 MG/24HR 1 patch, 1 patch, TransDERmal, NOW, Karla Sharp MD, 1 patch at 01/25/25 2017    ziprasidone (GEODON) 20 mg in sterile water 1 mL injection, 20 mg, IntraMUSCular, PRN, Kelsi Levin MD, 20 mg at 01/24/25 1653    Current Outpatient Medications:     LORazepam (ATIVAN) 0.5 MG tablet, Take 1 tablet by mouth 2 times daily. Max Daily Amount: 1 mg, Disp: , Rfl:     levothyroxine (SYNTHROID) 88 MCG tablet, Take 1 tablet by mouth Daily, Disp: , Rfl:

## 2025-01-27 VITALS
TEMPERATURE: 98.2 F | DIASTOLIC BLOOD PRESSURE: 66 MMHG | RESPIRATION RATE: 18 BRPM | BODY MASS INDEX: 39.27 KG/M2 | HEIGHT: 64 IN | SYSTOLIC BLOOD PRESSURE: 110 MMHG | HEART RATE: 75 BPM | WEIGHT: 230 LBS | OXYGEN SATURATION: 99 %

## 2025-01-27 LAB
EKG ATRIAL RATE: 71 BPM
EKG DIAGNOSIS: NORMAL
EKG P AXIS: 53 DEGREES
EKG P-R INTERVAL: 146 MS
EKG Q-T INTERVAL: 420 MS
EKG QRS DURATION: 78 MS
EKG QTC CALCULATION (BAZETT): 456 MS
EKG R AXIS: 45 DEGREES
EKG T AXIS: 16 DEGREES
EKG VENTRICULAR RATE: 71 BPM

## 2025-01-27 PROCEDURE — 6370000000 HC RX 637 (ALT 250 FOR IP): Performed by: EMERGENCY MEDICINE

## 2025-01-27 RX ORDER — HYDROXYZINE HYDROCHLORIDE 50 MG/1
50 TABLET, FILM COATED ORAL EVERY 6 HOURS PRN
COMMUNITY
Start: 2024-06-18

## 2025-01-27 RX ORDER — ASPIRIN 81 MG/1
81 TABLET ORAL DAILY
COMMUNITY

## 2025-01-27 RX ORDER — DIAZEPAM 5 MG/1
5 TABLET ORAL EVERY 6 HOURS PRN
COMMUNITY
Start: 2024-11-11

## 2025-01-27 RX ORDER — ATORVASTATIN CALCIUM 10 MG/1
10 TABLET, FILM COATED ORAL DAILY
COMMUNITY
Start: 2024-03-21

## 2025-01-27 RX ORDER — ALBUTEROL SULFATE 90 UG/1
2 INHALANT RESPIRATORY (INHALATION) EVERY 6 HOURS PRN
COMMUNITY

## 2025-01-27 RX ORDER — ESCITALOPRAM OXALATE 10 MG/1
10 TABLET ORAL DAILY
COMMUNITY

## 2025-01-27 RX ORDER — CLONAZEPAM 1 MG/1
2 TABLET ORAL 2 TIMES DAILY PRN
COMMUNITY
Start: 2024-07-09

## 2025-01-27 RX ORDER — MIRTAZAPINE 30 MG/1
30 TABLET, FILM COATED ORAL NIGHTLY
COMMUNITY
Start: 2024-12-13

## 2025-01-27 RX ORDER — ARIPIPRAZOLE 30 MG/1
30 TABLET ORAL DAILY
COMMUNITY
Start: 2025-01-13

## 2025-01-27 RX ADMIN — LEVOTHYROXINE SODIUM 88 MCG: 0.09 TABLET ORAL at 09:22

## 2025-01-27 RX ADMIN — LORAZEPAM 0.5 MG: 0.5 TABLET ORAL at 09:22

## 2025-01-27 RX ADMIN — ESCITALOPRAM OXALATE 20 MG: 10 TABLET ORAL at 09:22

## 2025-01-27 ASSESSMENT — ENCOUNTER SYMPTOMS
GASTROINTESTINAL NEGATIVE: 1
RESPIRATORY NEGATIVE: 1

## 2025-01-27 ASSESSMENT — PAIN SCALES - GENERAL: PAINLEVEL_OUTOF10: 3

## 2025-01-27 ASSESSMENT — PAIN - FUNCTIONAL ASSESSMENT: PAIN_FUNCTIONAL_ASSESSMENT: 0-10

## 2025-01-27 NOTE — ED NOTES
Pt in forensic restraints w/ PD present. Cuff changed from L wrist to R wrist. Skin under cuffs dry and intact. Capillary refill less than 3 seconds. No complaints of pain.

## 2025-01-27 NOTE — BSMART NOTE
Dot with [facility] called to advise that she is going to follow up on the bed search and see if there are any updates and call back this writer with any.

## 2025-01-27 NOTE — CONSULTS
Hospitalist Consult Note    NAME: Charlotte Alvarez   :  1980   MRN:  [id number]     Date/Time:  2025 3:40 PM    Patient PCP: None, None    ______________________________________________________________________  Given the patient's current clinical presentation in regards to the patient's multiple medical problems, complex decision making was performed, which includes reviewing the patient's available past medical records, laboratory results, and diagnostic studies.       My assessment of this patient's clinical condition and my plan of care is as follows.    Assessment / Plan:  Schizoaffective disorder  Suicidal ideation  Psychiatry team management    Hypothyroidism   Continue Synthroid    Tobacco use  Nicotine patch        Medical Decision Making     [] High (any 2)     A. Problems (any 1)  [] Acute/Chronic Illness/injury posing threat to life or bodily function:    [] Severe exacerbation of chronic illness:    ---------------------------------------------------------------------  B. Risk of Treatment (any 1)   [] Drugs/treatments that require intensive monitoring for toxicity include:    [] IV ABX requiring serial renal monitoring for nephrotoxicity:     [] IV Narcotic analgesia for adverse drug reaction  [] Aggressive IV diuresis requiring serial monitoring for renal impairment and electrolyte derangements  [] Critical electrolyte abnormalities requiring IV replacement and close serial monitoring  [] Insulin - monitoring serial FSBS for Hypoglycemic adverse drug reaction  [] Other -   [] Change in code status:    [] Decision to escalate care:    [] Major surgery/procedure with associated risk factors:     ----------------------------------------------------------------------  C. Data (any 2)  [] Discussed current management and discharge planning options with Case Management.  [x] Discussed management of the case with: Nurse, patient  [] Telemetry personally reviewed and interpreted as documented

## 2025-01-27 NOTE — PROGRESS NOTES
Writer called [facility] to check status.  Per Dot waiting on vitals, EKG, and depakote level.    Writer advised they were sent at 10:14pm as requested by Suma with [facility].     Dot searched and found them.  No other clinicals needed at this time.

## 2025-01-27 NOTE — BSMART NOTE
Vitals    Recent BP Pulse Temp Respirations SpO2   01/27 0725 107/66 73 98 °F (36.7 °C) 18 98 %   01/27 0630 112/75 73 97.7 °F (36.5 °C) 17 99 %   01/26 1825 114/70 91 97.3 °F (36.3 °C) 16 97 %            Initial        01/24 1542 106/52 Important  62 98.5 °F (36.9 °C) -- 99 %       Height and Weight    Height Last Wt BMI   162.6 cm (5' 4\") 104.3 kg (230 lb) 39.48 kg/m² Important    3 days ago 3 days ago 3 days ago

## 2025-01-27 NOTE — ED NOTES
Pt screaming in the room, documenting RN went to check on her and advised pt to stop masturbating Pt proceeded to call documenting RN a \"faggot and shut my door\"

## 2025-01-27 NOTE — BH NOTE
HEARING DISPOSITION    Special Justice: Judge Lawson  : Ms. Winters   Committed: 10 days to a [facility] approved facility  Expires: 2/5/2025

## 2025-01-27 NOTE — ED NOTES
Pt resting in bed w/ eyes closed. Resp even and unlabored. Skin warm and dry. NAD. No apparent needs at this time. 1:1 sitter and PD remain outside of pt's room.

## 2025-01-27 NOTE — BSMART NOTE
Pt accepted     [facility]  Dr. Srinivasan  [phone]  Lois RN ED aware  Awaiting [facility] for transport

## 2025-01-27 NOTE — ED NOTES
Blaine cabezas is here at this time to get the pt , pt has belongings in locker that are going with the pt and pt has 9 trash bags that are being loaded up in a wheelchair to go with the pt to the facility

## 2025-01-27 NOTE — BSMART NOTE
[facility] Liaison Team Note     LOS:  0     Patient goal(s) for today:  take medications as prescribed, make needs known in an appropriate manner, engage in supportive counseling as needed.     [facility] Liaison team focus/goals: assess needs, provide support and education, coordinate care, provided supportive counseling as needed.    Progress note: Liaison met with pt face to face in ED room 23, pt dressed in green gown cuffed to the bed at wrist. Sitter and [place] officer at doorway. Pt presents very guarded, spoke in hostile tone with poor eye contact. She reported SI, denies HI and AVH. Pt noted sleep to be irregular while appetite is normal. Pt requesting to go  to [facility] because she \"I feel unsafe\". Pt reported symptoms of depression and anxiety towards \"nothing specific\".    Barriers to Discharge: TDO hearing today  Guns in the home: unknown     Outpatient provider(s):  unknown  Insurance info/prescription coverage:  Payor: [facility] /  /  /      Diagnosis:   Past Medical History:   Diagnosis Date    Hypothyroid       Plan:  Pt will participate in TDO hearing today. Spoke with Suma at [facility] Crisis Team, requested updated vitals be dent over. Liaison notified [facility] nurse Mohamud. Spoke with Dr. KAUFMAN to provide update on pt. Per  notes, pt has been Committed: 10 days to a [facility] approved facility; Expires: 2/5/2025.    Follow up Psych Consult placed? Yes .   Psychiatrist updated? Yes, Dr. KAUFMAN        Participating treatment team members: Charlotte Alvarez, Glendy Hilton, SHAHZAD-R

## 2025-08-15 ENCOUNTER — HOSPITAL ENCOUNTER (EMERGENCY)
Facility: HOSPITAL | Age: 45
Discharge: HOME | End: 2025-08-15
Attending: STUDENT IN AN ORGANIZED HEALTH CARE EDUCATION/TRAINING PROGRAM
Payer: COMMERCIAL

## 2025-08-15 VITALS
TEMPERATURE: 98.6 F | OXYGEN SATURATION: 99 % | HEART RATE: 78 BPM | BODY MASS INDEX: 40.97 KG/M2 | DIASTOLIC BLOOD PRESSURE: 76 MMHG | WEIGHT: 240 LBS | SYSTOLIC BLOOD PRESSURE: 117 MMHG | HEIGHT: 64 IN | RESPIRATION RATE: 19 BRPM

## 2025-08-15 DIAGNOSIS — F48.9 MENTAL HEALTH PROBLEM: Primary | ICD-10-CM

## 2025-08-15 LAB
AMPHET UR QL SCN: POSITIVE
APPEARANCE UR: ABNORMAL
BACTERIA URNS QL MICRO: NEGATIVE /HPF
BARBITURATES UR QL SCN: NEGATIVE
BENZODIAZ UR QL: POSITIVE
BILIRUB UR QL CFM: NEGATIVE
CANNABINOIDS UR QL SCN: NEGATIVE
COCAINE UR QL SCN: NEGATIVE
COLOR UR: ABNORMAL
EPITH CASTS URNS QL MICRO: ABNORMAL /LPF
FENTANYL: NEGATIVE
GLUCOSE UR STRIP.AUTO-MCNC: NEGATIVE MG/DL
HCG UR QL: NEGATIVE
HGB UR QL STRIP: NEGATIVE
KETONES UR QL STRIP.AUTO: 15 MG/DL
LEUKOCYTE ESTERASE UR QL STRIP.AUTO: NEGATIVE
Lab: ABNORMAL
METHADONE UR QL: NEGATIVE
NITRITE UR QL STRIP.AUTO: NEGATIVE
OPIATES UR QL: NEGATIVE
PCP UR QL: NEGATIVE
PH UR STRIP: 5 (ref 5–8)
PROT UR STRIP-MCNC: 30 MG/DL
RBC #/AREA URNS HPF: ABNORMAL /HPF (ref 0–5)
SP GR UR REFRACTOMETRY: >1.03 (ref 1–1.03)
URINE CULTURE IF INDICATED: ABNORMAL
UROBILINOGEN UR QL STRIP.AUTO: 1 EU/DL (ref 0.2–1)
WBC URNS QL MICRO: ABNORMAL /HPF (ref 0–4)

## 2025-08-15 PROCEDURE — 90791 PSYCH DIAGNOSTIC EVALUATION: CPT | Performed by: SOCIAL WORKER

## 2025-08-15 PROCEDURE — 81025 URINE PREGNANCY TEST: CPT

## 2025-08-15 PROCEDURE — 99283 EMERGENCY DEPT VISIT LOW MDM: CPT

## 2025-08-15 PROCEDURE — 81001 URINALYSIS AUTO W/SCOPE: CPT

## 2025-08-15 ASSESSMENT — PAIN - FUNCTIONAL ASSESSMENT: PAIN_FUNCTIONAL_ASSESSMENT: 0-10
